# Patient Record
Sex: FEMALE | Race: WHITE
[De-identification: names, ages, dates, MRNs, and addresses within clinical notes are randomized per-mention and may not be internally consistent; named-entity substitution may affect disease eponyms.]

---

## 2019-12-22 ENCOUNTER — HOSPITAL ENCOUNTER (INPATIENT)
Dept: HOSPITAL 92 - ERS | Age: 74
LOS: 11 days | Discharge: NURSING, REHAB OR LONG-TERM CARE | DRG: 492 | End: 2020-01-02
Attending: SURGERY | Admitting: SURGERY
Payer: MEDICARE

## 2019-12-22 VITALS — BODY MASS INDEX: 23 KG/M2

## 2019-12-22 DIAGNOSIS — S92.321A: ICD-10-CM

## 2019-12-22 DIAGNOSIS — Z87.891: ICD-10-CM

## 2019-12-22 DIAGNOSIS — I50.32: ICD-10-CM

## 2019-12-22 DIAGNOSIS — J84.112: ICD-10-CM

## 2019-12-22 DIAGNOSIS — J12.3: ICD-10-CM

## 2019-12-22 DIAGNOSIS — S82.841A: Primary | ICD-10-CM
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
## 2019-12-22 DIAGNOSIS — E11.65: ICD-10-CM
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
## 2019-12-22 DIAGNOSIS — I25.10: ICD-10-CM

## 2019-12-22 DIAGNOSIS — D62: ICD-10-CM

## 2019-12-22 DIAGNOSIS — Z88.0: ICD-10-CM

## 2019-12-22 DIAGNOSIS — E87.6: ICD-10-CM

## 2019-12-22 DIAGNOSIS — S92.331A: ICD-10-CM

## 2019-12-22 DIAGNOSIS — Z95.0: ICD-10-CM

## 2019-12-22 DIAGNOSIS — N39.0: ICD-10-CM

## 2019-12-22 DIAGNOSIS — Z90.49: ICD-10-CM

## 2019-12-22 DIAGNOSIS — J96.01: ICD-10-CM
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
## 2019-12-22 DIAGNOSIS — Z90.710: ICD-10-CM

## 2019-12-22 DIAGNOSIS — S06.0X9A: ICD-10-CM

## 2019-12-22 DIAGNOSIS — E27.40: ICD-10-CM

## 2019-12-22 DIAGNOSIS — N28.9: ICD-10-CM

## 2019-12-22 LAB
ALBUMIN SERPL BCG-MCNC: 3.8 G/DL (ref 3.4–4.8)
ALP SERPL-CCNC: 95 U/L (ref 40–110)
ALT SERPL W P-5'-P-CCNC: 12 U/L (ref 8–55)
ANION GAP SERPL CALC-SCNC: 14 MMOL/L (ref 10–20)
APTT PPP: 22.4 SEC (ref 22.9–36.1)
AST SERPL-CCNC: 24 U/L (ref 5–34)
BASOPHILS # BLD AUTO: 0.1 THOU/UL (ref 0–0.2)
BASOPHILS NFR BLD AUTO: 0.5 % (ref 0–1)
BILIRUB SERPL-MCNC: 1 MG/DL (ref 0.2–1.2)
BUN SERPL-MCNC: 14 MG/DL (ref 9.8–20.1)
CALCIUM SERPL-MCNC: 9 MG/DL (ref 7.8–10.44)
CHLORIDE SERPL-SCNC: 102 MMOL/L (ref 98–107)
CO2 SERPL-SCNC: 24 MMOL/L (ref 23–31)
CREAT CL PREDICTED SERPL C-G-VRATE: 0 ML/MIN (ref 70–130)
EOSINOPHIL # BLD AUTO: 0.1 THOU/UL (ref 0–0.7)
EOSINOPHIL NFR BLD AUTO: 1.2 % (ref 0–10)
GLOBULIN SER CALC-MCNC: 4.2 G/DL (ref 2.4–3.5)
GLUCOSE SERPL-MCNC: 184 MG/DL (ref 83–110)
HGB BLD-MCNC: 13.7 G/DL (ref 12–16)
INR PPP: 1.1
LYMPHOCYTES # BLD: 2.4 THOU/UL (ref 1.2–3.4)
LYMPHOCYTES NFR BLD AUTO: 22.2 % (ref 21–51)
MAGNESIUM SERPL-MCNC: 1.8 MG/DL (ref 1.6–2.6)
MCH RBC QN AUTO: 29.9 PG (ref 27–31)
MCV RBC AUTO: 87.4 FL (ref 78–98)
MONOCYTES # BLD AUTO: 0.9 THOU/UL (ref 0.11–0.59)
MONOCYTES NFR BLD AUTO: 8.4 % (ref 0–10)
NEUTROPHILS # BLD AUTO: 7.4 THOU/UL (ref 1.4–6.5)
NEUTROPHILS NFR BLD AUTO: 67.8 % (ref 42–75)
PLATELET # BLD AUTO: 222 THOU/UL (ref 130–400)
POTASSIUM SERPL-SCNC: 4.2 MMOL/L (ref 3.5–5.1)
PROTHROMBIN TIME: 14 SEC (ref 12–14.7)
RBC # BLD AUTO: 4.56 MILL/UL (ref 4.2–5.4)
SODIUM SERPL-SCNC: 136 MMOL/L (ref 136–145)
WBC # BLD AUTO: 10.9 THOU/UL (ref 4.8–10.8)

## 2019-12-22 PROCEDURE — 81001 URINALYSIS AUTO W/SCOPE: CPT

## 2019-12-22 PROCEDURE — G0390 TRAUMA RESPONS W/HOSP CRITI: HCPCS

## 2019-12-22 PROCEDURE — 70450 CT HEAD/BRAIN W/O DYE: CPT

## 2019-12-22 PROCEDURE — 87804 INFLUENZA ASSAY W/OPTIC: CPT

## 2019-12-22 PROCEDURE — C1713 ANCHOR/SCREW BN/BN,TIS/BN: HCPCS

## 2019-12-22 PROCEDURE — 87040 BLOOD CULTURE FOR BACTERIA: CPT

## 2019-12-22 PROCEDURE — 96361 HYDRATE IV INFUSION ADD-ON: CPT

## 2019-12-22 PROCEDURE — 87070 CULTURE OTHR SPECIMN AEROBIC: CPT

## 2019-12-22 PROCEDURE — 87389 HIV-1 AG W/HIV-1&-2 AB AG IA: CPT

## 2019-12-22 PROCEDURE — 93005 ELECTROCARDIOGRAM TRACING: CPT

## 2019-12-22 PROCEDURE — 83036 HEMOGLOBIN GLYCOSYLATED A1C: CPT

## 2019-12-22 PROCEDURE — 87206 SMEAR FLUORESCENT/ACID STAI: CPT

## 2019-12-22 PROCEDURE — 71045 X-RAY EXAM CHEST 1 VIEW: CPT

## 2019-12-22 PROCEDURE — 80048 BASIC METABOLIC PNL TOTAL CA: CPT

## 2019-12-22 PROCEDURE — 93306 TTE W/DOPPLER COMPLETE: CPT

## 2019-12-22 PROCEDURE — 71250 CT THORAX DX C-: CPT

## 2019-12-22 PROCEDURE — 84100 ASSAY OF PHOSPHORUS: CPT

## 2019-12-22 PROCEDURE — 82533 TOTAL CORTISOL: CPT

## 2019-12-22 PROCEDURE — 87116 MYCOBACTERIA CULTURE: CPT

## 2019-12-22 PROCEDURE — 87086 URINE CULTURE/COLONY COUNT: CPT

## 2019-12-22 PROCEDURE — 85025 COMPLETE CBC W/AUTO DIFF WBC: CPT

## 2019-12-22 PROCEDURE — 76000 FLUOROSCOPY <1 HR PHYS/QHP: CPT

## 2019-12-22 PROCEDURE — 85610 PROTHROMBIN TIME: CPT

## 2019-12-22 PROCEDURE — 85027 COMPLETE CBC AUTOMATED: CPT

## 2019-12-22 PROCEDURE — 96374 THER/PROPH/DIAG INJ IV PUSH: CPT

## 2019-12-22 PROCEDURE — 96375 TX/PRO/DX INJ NEW DRUG ADDON: CPT

## 2019-12-22 PROCEDURE — 87205 SMEAR GRAM STAIN: CPT

## 2019-12-22 PROCEDURE — 87633 RESP VIRUS 12-25 TARGETS: CPT

## 2019-12-22 PROCEDURE — 36416 COLLJ CAPILLARY BLOOD SPEC: CPT

## 2019-12-22 PROCEDURE — 80053 COMPREHEN METABOLIC PANEL: CPT

## 2019-12-22 PROCEDURE — 36415 COLL VENOUS BLD VENIPUNCTURE: CPT

## 2019-12-22 PROCEDURE — 85730 THROMBOPLASTIN TIME PARTIAL: CPT

## 2019-12-22 PROCEDURE — 83880 ASSAY OF NATRIURETIC PEPTIDE: CPT

## 2019-12-22 PROCEDURE — 82550 ASSAY OF CK (CPK): CPT

## 2019-12-22 PROCEDURE — 83735 ASSAY OF MAGNESIUM: CPT

## 2019-12-22 PROCEDURE — 85007 BL SMEAR W/DIFF WBC COUNT: CPT

## 2019-12-22 PROCEDURE — 72170 X-RAY EXAM OF PELVIS: CPT

## 2019-12-22 PROCEDURE — 84484 ASSAY OF TROPONIN QUANT: CPT

## 2019-12-22 PROCEDURE — 94640 AIRWAY INHALATION TREATMENT: CPT

## 2019-12-22 PROCEDURE — 82553 CREATINE MB FRACTION: CPT

## 2019-12-22 PROCEDURE — 93010 ELECTROCARDIOGRAM REPORT: CPT

## 2019-12-22 RX ADMIN — CLINDAMYCIN PHOSPHATE SCH MLS: 900 INJECTION, SOLUTION INTRAVENOUS at 21:39

## 2019-12-22 RX ADMIN — DOCUSATE SODIUM 50 MG AND SENNOSIDES 8.6 MG SCH TAB: 8.6; 5 TABLET, FILM COATED ORAL at 20:06

## 2019-12-22 RX ADMIN — INSULIN HUMAN PRN UNIT: 100 INJECTION, SOLUTION PARENTERAL at 18:06

## 2019-12-22 RX ADMIN — INSULIN HUMAN PRN UNIT: 100 INJECTION, SOLUTION PARENTERAL at 21:39

## 2019-12-22 NOTE — RAD
RIGHT KNEE FOUR VIEWS:

 

HISTORY:

Fall. Right knee injury.

 

FINDINGS:

Mild joint space narrowing of the medial compartment. Mild tricompartmental osteophytosis. No acute f
racture or dislocation is apparent. Fluid distends the suprapatellar bursa on the lateral view withou
t a fat fluid level.

 

IMPRESSION:

1. Fluid distention of the joint capsule and suprapatellar bursa. This could represent blood or fluid
 from an acute injury or simply reflect an effusion from otherwise mild osteoarthritic changes.

 

2. No fractures are directly visualized.

 

POS: BST

## 2019-12-22 NOTE — RAD
RIGHT ANKLE THREE VIEWS:

 

HISTORY:

Fall. Right ankle injury.

 

FINDINGS:

Mildly comminuted, oblique fracture of the distal fibula is present just above the level of the ankle
 mortise. Minimal lateral displacement.

 

Nondisplaced oblique fracture involves the base of the medial malleolus. Tibiotalar alignment is main
tained. There is heterogeneous mixed sclerotic and lucent subcortical abnormality involving the media
l corner of the talar dome.

 

IMPRESSION:

1. Bimalleolar fracture, right ankle.

 

2. Osteochondral abnormality involving the medial corner of the talar dome.

 

POS: BST

## 2019-12-22 NOTE — RAD
EXAM:

XR Pelvis AP STANDARD



PROVIDED CLINICAL HISTORY:

Pain



FINDINGS:

There is no evidence for fracture or other acute osseous abnormality. Alignment appears anatomic. Katie
nt spaces appear preserved.



IMPRESSION:

No evidence for an acute osseous abnormality. If there is persistent clinical concern, conservative m
anagement and follow-up imaging advised.



Reported By: Tahir Ruby 

Electronically Signed:  12/22/2019 11:56 AM

## 2019-12-22 NOTE — CON
DATE OF CONSULTATION:  



This is Familia Lee PA-C dictating a report for Anthony Becker MD.



HISTORY OF PRESENT ILLNESS:  We were asked by ER and Trauma to see patient.  The

patient was in her normal state of health.  When she was on her stairs, she twisted,

had a little bump and fell down.  She has multiple bodily aches.  Her lower back

hurts a little bit as does her tailbone.  Her forearms hurt a little bit and she

sustained a foot and ankle fracture on the right side.  She has no loss of

sensation.  Denies any loss of consciousness.  Her imaging studies have been

acquired to the areas of pain that she complains about and the only positive

findings were a right ankle and foot fracture.  Again, no numbness and tingling down

the leg but quite sore, especially with moving.  Currently, she is being worked up

in the emergency room.  We have called the operating room to get a space on the

schedule to get her ankle fixed and possibly home today.  Her son is at the bedside

with her. 



PAST MEDICAL HISTORY:  Positive for cardiac, diabetes.



SURGERIES:  Pacemaker, hysterectomy.



SOCIAL HISTORY:  Retired from [employer].  Past smoker, quit 20

years ago.  No ETOH or drug use. 



FAMILY HISTORY:  For this visit is noncontributory, although her mother came in for

surgery and got a postop pneumonia in past, so she is quite fearful of this. 



ALLERGIES:  SHE HAS HAD REMOTE ALLERGIES IN THE PAST TO PENICILLIN, BUT SHE STATES

SHE IS NO LONGER ALLERGIC. 



MEDICATIONS:  She takes some OTC medications but no blood thinners and controls her

diabetes with her diet.  She is very independent and active. 



REVIEW OF SYSTEMS:  Again, multiple bodily aches and pains, but no chest pain,

shortness of breath.  She does have positive pain to the right ankle and foot with

positive edema and ecchymosis.  Rest of review of systems negative. 



PHYSICAL EXAMINATION:

GENERAL:  Well-nourished, well-developed female, alert, pleasant, no acute distress.

 Speech clear.  Affect pleasant.  Answer questions appropriately.  Alert and

oriented x3. 

HEENT:  Normal exam.  Face symmetric.  Tongue midline. 

NECK:  Supple.  Trachea midline. 

LUNGS:  Respirations 16.  No acute distress.  ER has around some oxygen currently 

PELVIS:  Rocked, no pain.   

EXTREMITIES:  Upper extremities equal size, shape, symmetry, normal bulk and tone.

Movements are equal as are sensations.  Bilateral lower extremities are equal size,

shape, symmetry, normal bulk and tone with the exception of the right ankle and foot

have positive edema and ecchymosis.  She has good DP, PT pulses bilaterally and her

sensations are intact.  She is able to wiggle both feet fine but this causes some

ankle pain. 



ASSESSMENT:  Fall with ensuing ankle-foot fracture on the right.



PLAN:  I spoke with son and mom.  Discussed surgical intervention, risks and

benefits of surgery.  Other questions and concerns have been addressed.  They are

amenable to go forward with surgery.  ER will continue their workup to see if she

has any medical symptoms or conditions that would negate her from having surgery.

Trauma will be admitting.  Once we get her up to the operating room, we will get her

on some antibiotics, get her consented for the surgery and then if all goes well,

may be get her discharged later today or early tomorrow, which makes her quite

happy. 







Job ID:  [id number]

## 2019-12-22 NOTE — CT
Exam:

CT brain



PROVIDED CLINICAL HISTORY:

Head injury



COMPARISON:

None



FINDINGS:

The ventricular system is normal in size and morphology.  No evidence for intracranial hemorrhage or 
mass effect. The extracranial soft tissues and osseous structures demonstrate no evidence for an

acute abnormality.



IMPRESSION:

No evidence for intracranial hemorrhage or mass effect.



Reported By: Tahir Ruby 

Electronically Signed:  12/22/2019 11:23 AM

## 2019-12-22 NOTE — RAD
EXAM:

Portable chest



PROVIDED CLINICAL HISTORY:

Preop



COMPARISON:

None



FINDINGS:

The cardiac silhouette appears likely enlarged. There is obscuration of the left heart margin, which 
could be on the basis of lingular consolidation. Prominence of the pulmonary interstitium is noted

diffusely, chronicity of which is not certain. No evidence for large effusion or evidence for pneumot
horax. Bilateral calcified breast prostheses. Vascular calcification. Left subclavian cardiac

pacing device with lead tips overlying expected locations of RA and RV. No focal consolidation, pleur
al fluid or pneumothorax evident.



IMPRESSION:

Cardiomegaly and diffuse prominence of the pulmonary interstitium, the chronicity of the latter of wh
ich is not certain. Possible lingular airspace disease. Consider correlating with a lateral view.



Reported By: Tahir Ruby 

Electronically Signed:  12/22/2019 12:53 PM

## 2019-12-22 NOTE — RAD
RIGHT HIP TWO VIEWS:

 

HISTORY:

Fall. Hip injury.

 

FINDINGS:

Joint space is preserved. Minimal osteophytosis. Femoral head contour is maintained. No acute fractur
e or dislocation. Calcification over the arterial structures.

 

IMPRESSION:

1. No acute osseous abnormalities are demonstrated.

 

2. Atherosclerosis.

 

POS: BST

## 2019-12-22 NOTE — RAD
RIGHT FOOT THREE VIEWS:

 

HISTORY:

Right foot injury.

 

FINDINGS:

Subtle oblique lucency through the mid shaft of the second metatarsal. No significant displacement. O
verlying soft tissue swelling. On two of the views there is a subtle oblique lucency across the proxi
mal metaphysis of the third metatarsal.

 

Lisfranc joint alignment is anatomic. Degenerative changes throughout the foot.

 

Hallux valgus and bunion deformity.

 

Bimalleolar ankle fracture is partially visualized.

 

IMPRESSION:

1. Right second and third metatarsal fractures.

 

2. Ankle abnormalities better detailed on dedicated ankle examination.

 

3. Hallux valgus and bunion deformity.

 

POS: BST

## 2019-12-22 NOTE — HP
Admission Date: 12/22/19



REQUESTING PHYSICIAN:  Dr. Barrera, ER physician.



ATTENDING TRAUMA SURGEON:  Dr. Winn.



ORTHOPEDIC SURGERY:  Dr. Becker.



HISTORY OF PRESENT ILLNESS:  This is a 74-year-old female, who was at her home

walking down her wooden steps when the step broke causing her to fall 
approximately

6 feet, landing on her buttocks causing her to twist her right ankle.  The 
patient

states that she also hit the back of her head.  The patient reports being 
knocked

out for a very short period of time. 



The patient states that this happened yesterday sometime in the afternoon and 
had to

crawl to get help, as she was outside. The patient states that when she fell, 
she

landed on dirt and grass.  The patient continued to have right ankle pain and

swelling and presented to the emergency room this morning, where she was 
evaluated.

The patient also reports that she was tested positive for flu approximately 2 
weeks

ago and has been taking NyQuil Flu and Cold since.  The patient does have an

occasional nonproductive cough currently.  The patient denies recent fever or

chills.  The patient denies any chest pain, shortness of breath, or dizziness 
prior

to falling.  The patient has recently moved to the area from [place].  The

patient's primary care physician in [place] is Dr. Rodríguez.  The patient also 
has a

history of type 2 diabetes, which is diet controlled. 



PAST MEDICAL HISTORY:  Type 2 diabetes, diet controlled; coronary artery disease
;

slow heartbeat with syncopal episodes. 



PAST SURGICAL HISTORY:  Pacemaker placement in January of 2018, appendectomy, 
and

hysterectomy. 



ALLERGIES:  NO KNOWN DRUG ALLERGIES.



CURRENT MEDICATIONS:  NyQuil Flu and Cold.



SOCIAL HISTORY:  The patient quit smoking 30 years ago, smoked for 
approximately 26

years, denies alcohol use, denies any illicit drug use. 



REVIEW OF SYSTEMS:  A 10-point review of systems is negative unless otherwise

indicated in the above HPI. 



PHYSICAL EXAMINATION:

VITAL SIGNS:  Blood pressure 103/77, pulse 92, respirations 20, SpO2 of 96% on 
room

air, and temperature 98.3. 

GENERAL:  Well-appearing elderly female, awake, alert, in no distress. 

HEENT:  Head is atraumatic and normocephalic, midface is stable, extraocular 
muscles

intact, mucous membranes moist. 

NECK:  No cervical spine tenderness, normal range of motion of neck, no JVD, 
trachea

is midline. 

RESPIRATORY:  Equal chest rise and fall, no respiratory distress, bilateral 
breath

sounds are clear without any wheezing, rales, or rhonchi. 

CARDIAC:  Regular rate, regular rhythm, 2/6 systolic murmur, no pedal edema. 

ABDOMEN:  Soft, nontender, nondistended. 

EXTREMITIES:  Moves all extremities, no focal deficits, distal pulses intact, 2
+ in

all extremities, swelling and deformity to the right ankle, also swelling to 
right

knee with a small abrasion.  Right lower extremity currently splinted.  Pelvis 
is

stable.  No back pain. 

NEUROLOGIC:  The patient is oriented to person, place, time, and event.  No 
focal

deficits. 



LABORATORY DATA:  WBC 10.9, RBC 4.56, hemoglobin 13.7, hematocrit 39.9, and

platelets 222.  Sodium 136, potassium 4.2, chloride 102, BUN 14, creatinine 0.85
,

estimated GFR 65, glucose 184, calcium 9.0, total bilirubin 1.0, AST 24, ALT 12,

alkaline phos 95, and albumin 3.8. 



DIAGNOSTIC DATA:  Right ankle x-ray:  Impression; bimalleolar fracture, right 
ankle.

 Osteochondral abnormality involving the medial corner of the talar dome. 



Brain CT:  Impression; no evidence for intracranial hemorrhage or mass effect. 



Right foot x-ray:  Impression; right 2nd and 3rd metatarsal fractures. 



Right knee x-ray:  Fluid distension of the joint capsule and suprapatellar 
bursa.

No fractures are directly visualized. 



Pelvis x-ray:  Impression; there is no evidence for fracture or other acute 
osseous

abnormality. 



Hip x-ray, right:  No acute osseous abnormalities are demonstrated. 



Chest x-ray:  Impression; cardiomegaly and diffuse prominence of the pulmonary

interstitium, the chronicity of which is not concern.  Possible lingular 
airspace

disease. 



ASSESSMENT:  

1. Fall from stairs approximately 6 feet with loss of consciousness.

2. Bimalleolar fracture, right ankle.

3. Right knee fluid distension of the joint capsule and suprapatellar bursa.

4. Right 2nd and 3rd metatarsal fractures.

5. Concussion.

6. History of type 2 diabetes, diet controlled, Pacemaker placement, and recent 
flu two weeks ago.



PLAN:  The patient will be n.p.o. with IV maintenance fluids, normal saline.  
Dr. Becker with Orthopedic Surgery plans to take the patient to the OR for repair 
of

this right bimalleolar fracture.  The patient's diet will be advanced to a 
diabetic

diet postop as tolerated.  A PT and OT consult will be placed to evaluate and 
treat

postop.  We will place a post-acute screen for possible rehab placement.  We 
will

place the patient on mechanical and chemical DVT prophylaxis postop.  The plan 
was

discussed with the patient, who agrees.  The plan will be discussed with the

attending after this dictation. 







Job ID:  [id number]



MTDD

## 2019-12-23 LAB
ANION GAP SERPL CALC-SCNC: 10 MMOL/L (ref 10–20)
BASOPHILS # BLD AUTO: 0 THOU/UL (ref 0–0.2)
BASOPHILS NFR BLD AUTO: 0.2 % (ref 0–1)
BUN SERPL-MCNC: 19 MG/DL (ref 9.8–20.1)
CALCIUM SERPL-MCNC: 7.8 MG/DL (ref 7.8–10.44)
CHLORIDE SERPL-SCNC: 106 MMOL/L (ref 98–107)
CO2 SERPL-SCNC: 23 MMOL/L (ref 23–31)
CREAT CL PREDICTED SERPL C-G-VRATE: 49 ML/MIN (ref 70–130)
EOSINOPHIL # BLD AUTO: 0 THOU/UL (ref 0–0.7)
EOSINOPHIL NFR BLD AUTO: 0.2 % (ref 0–10)
GLUCOSE SERPL-MCNC: 208 MG/DL (ref 83–110)
HGB BLD-MCNC: 10.8 G/DL (ref 12–16)
LYMPHOCYTES # BLD: 2.6 THOU/UL (ref 1.2–3.4)
LYMPHOCYTES NFR BLD AUTO: 24.8 % (ref 21–51)
MAGNESIUM SERPL-MCNC: 1.9 MG/DL (ref 1.6–2.6)
MCH RBC QN AUTO: 29.8 PG (ref 27–31)
MCV RBC AUTO: 89.6 FL (ref 78–98)
MONOCYTES # BLD AUTO: 0.8 THOU/UL (ref 0.11–0.59)
MONOCYTES NFR BLD AUTO: 7.5 % (ref 0–10)
NEUTROPHILS # BLD AUTO: 7 THOU/UL (ref 1.4–6.5)
NEUTROPHILS NFR BLD AUTO: 67.3 % (ref 42–75)
PLATELET # BLD AUTO: 192 THOU/UL (ref 130–400)
POTASSIUM SERPL-SCNC: 4.2 MMOL/L (ref 3.5–5.1)
RBC # BLD AUTO: 3.63 MILL/UL (ref 4.2–5.4)
SODIUM SERPL-SCNC: 135 MMOL/L (ref 136–145)
WBC # BLD AUTO: 10.5 THOU/UL (ref 4.8–10.8)

## 2019-12-23 RX ADMIN — DOCUSATE SODIUM 50 MG AND SENNOSIDES 8.6 MG SCH TAB: 8.6; 5 TABLET, FILM COATED ORAL at 08:40

## 2019-12-23 RX ADMIN — DOCUSATE SODIUM 50 MG AND SENNOSIDES 8.6 MG SCH TAB: 8.6; 5 TABLET, FILM COATED ORAL at 20:50

## 2019-12-23 RX ADMIN — HYDROCORTISONE SODIUM SUCCINATE SCH MG: 100 INJECTION, POWDER, FOR SOLUTION INTRAMUSCULAR; INTRAVENOUS at 17:14

## 2019-12-23 RX ADMIN — INSULIN HUMAN PRN UNIT: 100 INJECTION, SOLUTION PARENTERAL at 22:07

## 2019-12-23 RX ADMIN — INSULIN HUMAN PRN UNIT: 100 INJECTION, SOLUTION PARENTERAL at 06:04

## 2019-12-23 RX ADMIN — CLINDAMYCIN PHOSPHATE SCH MLS: 900 INJECTION, SOLUTION INTRAVENOUS at 05:58

## 2019-12-23 NOTE — PRG
DATE OF SERVICE:  12/23/2019



SUBJECTIVE:  Ms. García is a 74-year-old woman postop day #1, status post ORIF of

bimalleolar ankle fracture.  This morning, she reports adequate pain control. 



Her blood pressures have been low overnight.



OBJECTIVE:  VITAL SIGNS:  Include blood pressure 92/53, pulse 73, respiratory rate

16, temperature 97.6 degrees Fahrenheit, oxygen saturation is 95% on room air.

Admitting blood pressure was 112/56. 

HEENT:  Pupils are equal, round, and reactive to light and accommodation. 

HEART:  Reveals regular rate and rhythm.  No murmurs or gallops auscultated. 

LUNGS:  Clear to auscultation bilaterally.  Breathing, regular and nonlabored. 

ABDOMEN:  Soft, nontender, and nondistended. 

NEUROLOGIC:  Reveals no focal deficits present. 

EXTREMITIES:  Reveal 2+ radial and pedal pulses bilaterally.  Right lower extremity

immobilized in a long splint. 



LABORATORY FINDINGS:  Today include a CBC with 10,500 white blood cells, hemoglobin

and hematocrit of 10.8 and 32.5 respectively.  Platelet count 192,000. 



Metabolic profile; sodium 135, potassium 4.2, chloride is 106, bicarb is 23, BUN 19,

creatinine 0.93, glucose 208, total bilirubin 1.9, and phosphorus is 4.7.  Serum

cortisol level markedly low at 1.2. 



IMPRESSION:  

1. Postop day #1 status post bimalleolar right ankle fracture repair.

2. Acute adrenal insufficiency.

3. Acute blood loss anemia.



PLAN:  

1. Initiate physical and occupational therapy.

2. The patient will be placed on hydrocortisone and anticipate weaning of

hydrocortisone over the next 24 to 48 hours as blood pressure tolerates. 

3. The patient will be transferred to inpatient rehabilitation once hemodynamically 

stable.

4. Anticipate discharge within the next 24 to 48 hours.

5. Above findings and plan discussed with the patient who indicates understanding

and information given.  I have answered her questions. 







Job ID:  [id number]

## 2019-12-23 NOTE — PRG
DATE OF SERVICE:  12/23/2019



SUBJECTIVE:  The patient was seen this evening during rounds.  She is postop day 0

after right bimalleolar fracture fixation.  The patient also has a right 2nd and 3rd

metatarsal fracture and concussion.  At the time of my evaluation, the patient

reported her pain was well controlled.  She had no complaint. 



OBJECTIVE:  VITAL SIGNS:  Temperature 97.7, pulse 75, respirations 16, oxygen

saturation 93% on room air, blood pressure 94/51. 

GENERAL:  Well-appearing elderly female, sitting up in bed with no signs of acute

distress. 

PULMONARY:  Equal chest rise and fall.  No signs of acute respiratory distress.



ASSESSMENT:  

1. Status post fall from 6 feet.

2. Right bimalleolar fracture.

3. Right 2nd and 3rd metatarsal fractures.

4. Concussion.

5. History of diabetes, pacemaker, and coronary artery disease.



PLAN:  Continue current diet and pain regimen.  Discontinue IV fluids.  The patient

to work with Physical and Occupational Therapy tomorrow and is pending placement in

acute rehab facility. 







Job ID:  [id number]

## 2019-12-24 LAB
ANION GAP SERPL CALC-SCNC: 7 MMOL/L (ref 10–20)
BUN SERPL-MCNC: 13 MG/DL (ref 9.8–20.1)
CALCIUM SERPL-MCNC: 8.3 MG/DL (ref 7.8–10.44)
CHLORIDE SERPL-SCNC: 107 MMOL/L (ref 98–107)
CO2 SERPL-SCNC: 27 MMOL/L (ref 23–31)
CREAT CL PREDICTED SERPL C-G-VRATE: 59 ML/MIN (ref 70–130)
GLUCOSE SERPL-MCNC: 156 MG/DL (ref 83–110)
HGB BLD-MCNC: 10.6 G/DL (ref 12–16)
MAGNESIUM SERPL-MCNC: 2.4 MG/DL (ref 1.6–2.6)
MCH RBC QN AUTO: 29.7 PG (ref 27–31)
MCV RBC AUTO: 90.4 FL (ref 78–98)
MDIFF COMPLETE?: YES
PLATELET # BLD AUTO: 208 THOU/UL (ref 130–400)
POTASSIUM SERPL-SCNC: 4.1 MMOL/L (ref 3.5–5.1)
RBC # BLD AUTO: 3.58 MILL/UL (ref 4.2–5.4)
SODIUM SERPL-SCNC: 137 MMOL/L (ref 136–145)
WBC # BLD AUTO: 8.7 THOU/UL (ref 4.8–10.8)

## 2019-12-24 RX ADMIN — INSULIN HUMAN PRN UNIT: 100 INJECTION, SOLUTION PARENTERAL at 21:22

## 2019-12-24 RX ADMIN — HYDROCORTISONE SODIUM SUCCINATE SCH MG: 100 INJECTION, POWDER, FOR SOLUTION INTRAMUSCULAR; INTRAVENOUS at 00:38

## 2019-12-24 RX ADMIN — HYDROCORTISONE SODIUM SUCCINATE SCH: 100 INJECTION, POWDER, FOR SOLUTION INTRAMUSCULAR; INTRAVENOUS at 23:29

## 2019-12-24 RX ADMIN — INSULIN HUMAN PRN UNIT: 100 INJECTION, SOLUTION PARENTERAL at 06:15

## 2019-12-24 RX ADMIN — HYDROCORTISONE SODIUM SUCCINATE SCH MG: 100 INJECTION, POWDER, FOR SOLUTION INTRAMUSCULAR; INTRAVENOUS at 17:07

## 2019-12-24 RX ADMIN — DOCUSATE SODIUM 50 MG AND SENNOSIDES 8.6 MG SCH TAB: 8.6; 5 TABLET, FILM COATED ORAL at 08:31

## 2019-12-24 RX ADMIN — INSULIN HUMAN PRN UNIT: 100 INJECTION, SOLUTION PARENTERAL at 17:07

## 2019-12-24 RX ADMIN — DOCUSATE SODIUM 50 MG AND SENNOSIDES 8.6 MG SCH TAB: 8.6; 5 TABLET, FILM COATED ORAL at 19:50

## 2019-12-24 RX ADMIN — HYDROCORTISONE SODIUM SUCCINATE SCH MG: 100 INJECTION, POWDER, FOR SOLUTION INTRAMUSCULAR; INTRAVENOUS at 11:43

## 2019-12-24 RX ADMIN — HYDROCORTISONE SODIUM SUCCINATE SCH MG: 100 INJECTION, POWDER, FOR SOLUTION INTRAMUSCULAR; INTRAVENOUS at 05:08

## 2019-12-24 RX ADMIN — INSULIN HUMAN PRN UNIT: 100 INJECTION, SOLUTION PARENTERAL at 11:53

## 2019-12-24 NOTE — PRG
DATE OF SERVICE:  12/24/2019



SUBJECTIVE:  The patient is postoperative day 2 status post open reduction and

internal fixation of a right bimalleolar fracture, closed treatment of right second

and third metatarsal fractures.  The patient overnight had no issues.  Yesterday,

she was started on hydrocortisone for adrenal insufficiency, which appears to have

made an improvement.  The patient's pain is controlled.  She is tolerating a diet

and began working with Physical and Occupational Therapy. 



OBJECTIVE:  VITAL SIGNS:  Temperature is 98.6, heart rate 90, blood pressure 119/60,

respirations 16, oxygen saturation 91% on room air. 

GENERAL:  The patient is resting comfortably in bed.  She is awake, alert, and

oriented. 

HEENT:  Unremarkable. 

LUNGS:  Clear to auscultation bilaterally. 

HEART:  Regular rate and rhythm. 

ABDOMEN:  Soft, flat, and nontender with active bowel sounds. 

EXTREMITIES:  Neurovascularly intact x4.  Right lower extremity has a clean, dry,

and intact splint on it. 



LABORATORY FINDINGS:  White blood cell count 8.7, hemoglobin 10.6, hematocrit 32.4,

platelets 208.  Sodium 137, potassium 4.1, chloride 107, CO2 of 27, BUN 13,

creatinine 0.78, glucose 156, magnesium 2.4, phosphorus 2.9. 



IMAGING STUDIES:  There are no radiographs to review this morning.



ASSESSMENT:  

1. Status post ground level fall.

2. Status post open reduction and internal fixation of right bimalleolar fracture,

postoperative day 2. 

3. Acute adrenal insufficiency, improving.

4. Acute blood loss anemia, stable.



PLAN:  Plan will be to continue physical and occupational therapy, begin placement

process, continue hydrocortisone, and likely be able to wean off within the next 24

hours. 







Job ID:  [id number]

## 2019-12-24 NOTE — EKG
Test Reason : SX

Blood Pressure : ***/*** mmHG

Vent. Rate : 082 BPM     Atrial Rate : 082 BPM

   P-R Int : 150 ms          QRS Dur : 122 ms

    QT Int : 408 ms       P-R-T Axes : 060 -35 038 degrees

   QTc Int : 476 ms

 

Normal sinus rhythm

Left axis deviation

Right bundle branch block

Abnormal ECG

 

Confirmed by ASHLEY VALENTIN, IZAIAH (12),  NAZIA GAO ([id number]) on 12/24/2019 2:23:10 PM

 

Referred By:  ASHLEY           Confirmed By:IZAIAH RAMIREZ MD

## 2019-12-25 RX ADMIN — INSULIN HUMAN PRN UNIT: 100 INJECTION, SOLUTION PARENTERAL at 17:47

## 2019-12-25 RX ADMIN — HYDROCORTISONE SODIUM SUCCINATE SCH MG: 100 INJECTION, POWDER, FOR SOLUTION INTRAMUSCULAR; INTRAVENOUS at 12:05

## 2019-12-25 RX ADMIN — INSULIN HUMAN PRN UNIT: 100 INJECTION, SOLUTION PARENTERAL at 12:06

## 2019-12-25 RX ADMIN — DOCUSATE SODIUM 50 MG AND SENNOSIDES 8.6 MG SCH: 8.6; 5 TABLET, FILM COATED ORAL at 20:35

## 2019-12-25 RX ADMIN — HYDROCORTISONE SODIUM SUCCINATE SCH: 100 INJECTION, POWDER, FOR SOLUTION INTRAMUSCULAR; INTRAVENOUS at 05:07

## 2019-12-25 RX ADMIN — DOCUSATE SODIUM 50 MG AND SENNOSIDES 8.6 MG SCH TAB: 8.6; 5 TABLET, FILM COATED ORAL at 08:18

## 2019-12-25 RX ADMIN — HYDROCORTISONE SODIUM SUCCINATE SCH: 100 INJECTION, POWDER, FOR SOLUTION INTRAMUSCULAR; INTRAVENOUS at 20:34

## 2019-12-25 RX ADMIN — INSULIN HUMAN PRN UNIT: 100 INJECTION, SOLUTION PARENTERAL at 22:07

## 2019-12-25 NOTE — PRG
DATE OF SERVICE:  12/24/2019



SUBJECTIVE:  Ms. García is a 74-year-old female with status post ground level fall

after she sustained dry ankle fractures.  She underwent ORIF of right ankle fracture

postop day #2 and she also had acute adrenal insufficiency improved.  Currently, the

patient reports she has been doing good.  Pain is well controlled.  She tolerated

with her regular diet and she has been working with PT/OT.  She is using walker with

minimal support.  She developed no fever or shortness of breath. 



OBJECTIVE:  GENERAL:  Currently, patient lying down in bed comfortable with no acute

respiratory distress. 

VITAL SIGNS:  Stable. 

LUNGS:  Clear bilaterally. 

HEART:  Regular rate and rhythm. 

EXTREMITIES:  Neurovascularly intact x4.  Postop dressing and splint of the right

ankle clean, dry, and intact. 



PLAN:  Continue supportive care.  Continue pain control.  Continue DVT prophylaxis.

Anticipate placement in rehabilitation facility. 







Job ID:  [id number]

## 2019-12-25 NOTE — PRG
DATE OF SERVICE:  12/25/2019



SUBJECTIVE:  The patient remains on the surgical floor.  She is postop day 3 status

post open reduction and internal fixation of her right bimalleolar fracture and

closed treatment of right 2nd and 3rd metatarsal fracture.  The patient had no

issues overnight.  This morning, her pain is controlled.  She started working with

physical and occupational therapy, and she is tolerating a diet.  She was started on

hydrocortisone 2 days ago for adrenal insufficiency, which has made a significant

improvement in her blood pressure. 



OBJECTIVE:  VITAL SIGNS:  Temperature is 98.1, heart rate 95, blood pressure 158/76,

respirations 16, and oxygen saturation 92% on 2 L via nasal cannula. 

GENERAL:  The patient is resting comfortably on the side of the bed.  She just

returned from working with therapy.  She is awake, alert, and oriented x3.  Rajiv

Coma Scale is 15. 

LUNGS:  Respirations are clear bilaterally. 

HEART:  Regular rate and rhythm. 

ABDOMEN:  Soft, flat, nontender with active bowel sounds. 

EXTREMITIES:  Neurovascularly intact x4.  Right lower extremity has a clean, dry,

and intact splint on it. 



LABORATORY DATA:  There are no labs to review this morning.  Chest x-ray this

morning "stable exam." 



ASSESSMENT AND PLAN:  

1. Status post ground level fall.

2. Status post open reduction and internal fixation of right bimalleolar fracture,

postop day #3. 

3. Acute adrenal insufficiency, improved.

4. Acute blood loss anemia, stable.



PLAN:  Plan will be to continue encouraging physical and occupational therapy.

Begin weaning hydrocortisone.  Encourage incentive spirometry.  The patient was

given one 

dose of Lasix this morning as it was felt her oxygen saturation was dropping due to

potential fluid overload.  Begin placement process. 







Job ID:  [id number]

## 2019-12-25 NOTE — RAD
PORTABLE CHEST:

 

Date:  12/25/19 

 

HISTORY:  

Shortness of breath. 

 

COMPARISON:  

12/22/19 exam. 

 

FINDINGS:

Heart size is enlarged. Pacemaker is present. Parenchymal lung changes are; stable. Calcified breast 
implants are again noted. 

 

IMPRESSION: 

Stable exam. 

 

 

POS: OFF

## 2019-12-26 LAB — CK MB SERPL-MCNC: 1.4 NG/ML (ref 0–6.6)

## 2019-12-26 PROCEDURE — 0QSNXZZ REPOSITION RIGHT METATARSAL, EXTERNAL APPROACH: ICD-10-PCS | Performed by: ORTHOPAEDIC SURGERY

## 2019-12-26 PROCEDURE — 0QSJ04Z REPOSITION RIGHT FIBULA WITH INTERNAL FIXATION DEVICE, OPEN APPROACH: ICD-10-PCS | Performed by: ORTHOPAEDIC SURGERY

## 2019-12-26 RX ADMIN — DOCUSATE SODIUM 50 MG AND SENNOSIDES 8.6 MG SCH: 8.6; 5 TABLET, FILM COATED ORAL at 08:17

## 2019-12-26 RX ADMIN — DOCUSATE SODIUM 50 MG AND SENNOSIDES 8.6 MG SCH: 8.6; 5 TABLET, FILM COATED ORAL at 20:08

## 2019-12-26 RX ADMIN — HYDROCORTISONE SODIUM SUCCINATE SCH: 100 INJECTION, POWDER, FOR SOLUTION INTRAMUSCULAR; INTRAVENOUS at 03:26

## 2019-12-26 RX ADMIN — INSULIN HUMAN PRN UNIT: 100 INJECTION, SOLUTION PARENTERAL at 21:13

## 2019-12-26 RX ADMIN — HYDROCORTISONE SODIUM SUCCINATE SCH MG: 100 INJECTION, POWDER, FOR SOLUTION INTRAMUSCULAR; INTRAVENOUS at 11:45

## 2019-12-26 RX ADMIN — INSULIN HUMAN PRN UNIT: 100 INJECTION, SOLUTION PARENTERAL at 11:45

## 2019-12-26 RX ADMIN — INSULIN HUMAN PRN UNIT: 100 INJECTION, SOLUTION PARENTERAL at 16:48

## 2019-12-26 RX ADMIN — Medication PRN LOZ: at 21:34

## 2019-12-26 NOTE — PRG
DATE OF SERVICE:  2019



SUBJECTIVE:  The patient was seen this evening during rounds.  She was sitting up in

bed.  She was very emotional and upset.  Her son had came to visit her that along

with the holidays has reminded her multiple traumatic events, where family members

have .  She was overwhelmed with grief and emotion and had a difficult time

calming down. 



OBJECTIVE:  VITAL SIGNS:  Temperature 98.0, pulse 76, respirations 16, oxygen

saturation 94% on room air, blood pressure 96/56. 

GENERAL:  Well-appearing elderly female, sitting up in bed, very emotional with no

signs of acute distress. 

PULMONARY:  Equal chest rise and fall.  No signs of acute respiratory distress.



ASSESSMENT:  

1. Status post fall from 6 feet.

2. Right bimalleolar fracture.

3. Right second and third metatarsal fractures.

4. Concussion.

5. Acute adrenal insufficiency, resolving.

6. History of diabetes, pacemaker, and coronary artery disease.



PLAN:  We will continue the patient's current diet and pain regimen.  We will

increase her sliding scale as her blood sugars have been consistently over 200.  She

will receive a one time dose of p.r.n. Ativan this evening if she is not able to

calm down.  She will continue to work with Physical and Occupational Therapy and is

pending placement at acute rehab facility. 







Job ID:  [id number]

## 2019-12-26 NOTE — PRG
DATE OF SERVICE:  12/26/2019



SUBJECTIVE:  The patient is currently on the surgical floor.  She is postop day 4

status post open reduction and internal fixation of her right bimalleolar fracture

and closed treatment of right second and third metatarsal fractures.  Overnight, the

patient had no issues.  This morning, she reports that she is tolerating a diet.

Her pain is controlled.  She is working with Physical and Occupational Therapy.  The

patient is currently awaiting placement to rehab. 



OBJECTIVE:  VITAL SIGNS:  Temperature 97.7, heart rate 89, blood pressure 155/80,

respirations 18, and oxygen saturation 98% on 2 L via nasal cannula. 

GENERAL:  The patient is resting comfortably in bed.  She is awake, alert, and

verbally appropriate, though she does appear to have some confusion today that when

talking to her, seems to be more related to anxiety and feeling that she needs to

get to rehab today.  We reassured her that we would work on this, but due to

insurance, she may not be able to go until tomorrow. 

HEENT:  Unremarkable. 

LUNGS:  Clear to auscultation with good inspiratory and expiratory effort. 

HEART:  Regular rate and rhythm. 

ABDOMEN:  Soft, flat, and nontender with active bowel sounds. 

EXTREMITIES:  Neurovascularly intact x4.  Postop dressing is clean, dry, and intact.

 There are no labs or radiographs reviewed this morning. 



ASSESSMENT:  

1. Status post ground level fall.

2. Postop day 4 status post open reduction and internal fixation of right

bimalleolar fracture. 

3. Acute renal insufficiency, resolved.

4. Acute blood loss anemia, stable.



PLAN:  Plan will be to discontinue her hydrocortisone.  Continue encouraging her to

work with Physical and Occupational Therapy and incentive spirometry use.  We will

await final placement decision. 





Job ID:  [id number]

## 2019-12-27 LAB
ANION GAP SERPL CALC-SCNC: 12 MMOL/L (ref 10–20)
BACTERIA UR QL AUTO: (no result) HPF
BASOPHILS # BLD AUTO: 0.1 THOU/UL (ref 0–0.2)
BASOPHILS NFR BLD AUTO: 0.7 % (ref 0–1)
BUN SERPL-MCNC: 15 MG/DL (ref 9.8–20.1)
CALCIUM SERPL-MCNC: 8.2 MG/DL (ref 7.8–10.44)
CHLORIDE SERPL-SCNC: 102 MMOL/L (ref 98–107)
CO2 SERPL-SCNC: 28 MMOL/L (ref 23–31)
CREAT CL PREDICTED SERPL C-G-VRATE: 52 ML/MIN (ref 70–130)
EOSINOPHIL # BLD AUTO: 0.5 THOU/UL (ref 0–0.7)
EOSINOPHIL NFR BLD AUTO: 4.4 % (ref 0–10)
GLUCOSE SERPL-MCNC: 116 MG/DL (ref 83–110)
GLUCOSE UR STRIP-MCNC: 300 MG/DL
HGB BLD-MCNC: 11.9 G/DL (ref 12–16)
LEUKOCYTE ESTERASE UR QL STRIP.AUTO: 500 LEU/UL
LYMPHOCYTES # BLD: 4.6 THOU/UL (ref 1.2–3.4)
LYMPHOCYTES NFR BLD AUTO: 41.4 % (ref 21–51)
MCH RBC QN AUTO: 30.4 PG (ref 27–31)
MCV RBC AUTO: 90.8 FL (ref 78–98)
MONOCYTES # BLD AUTO: 0.9 THOU/UL (ref 0.11–0.59)
MONOCYTES NFR BLD AUTO: 7.8 % (ref 0–10)
NEUTROPHILS # BLD AUTO: 5.1 THOU/UL (ref 1.4–6.5)
NEUTROPHILS NFR BLD AUTO: 45.8 % (ref 42–75)
PLATELET # BLD AUTO: 260 THOU/UL (ref 130–400)
POTASSIUM SERPL-SCNC: 2.8 MMOL/L (ref 3.5–5.1)
PROT UR STRIP.AUTO-MCNC: 10 MG/DL
RBC # BLD AUTO: 3.91 MILL/UL (ref 4.2–5.4)
RBC UR QL AUTO: (no result) HPF (ref 0–3)
SODIUM SERPL-SCNC: 139 MMOL/L (ref 136–145)
TROPONIN I SERPL DL<=0.01 NG/ML-MCNC: 0.04 NG/ML (ref ?–0.03)
WBC # BLD AUTO: 11 THOU/UL (ref 4.8–10.8)
WBC UR QL AUTO: (no result) HPF (ref 0–3)

## 2019-12-27 RX ADMIN — DOCUSATE SODIUM 50 MG AND SENNOSIDES 8.6 MG SCH TAB: 8.6; 5 TABLET, FILM COATED ORAL at 09:51

## 2019-12-27 RX ADMIN — Medication PRN LOZ: at 02:31

## 2019-12-27 RX ADMIN — Medication PRN LOZ: at 18:06

## 2019-12-27 RX ADMIN — INSULIN HUMAN PRN UNIT: 100 INJECTION, SOLUTION PARENTERAL at 15:28

## 2019-12-27 RX ADMIN — Medication PRN LOZ: at 21:05

## 2019-12-27 RX ADMIN — INSULIN HUMAN PRN UNIT: 100 INJECTION, SOLUTION PARENTERAL at 21:09

## 2019-12-27 RX ADMIN — Medication PRN LOZ: at 05:12

## 2019-12-27 RX ADMIN — DOCUSATE SODIUM 50 MG AND SENNOSIDES 8.6 MG SCH: 8.6; 5 TABLET, FILM COATED ORAL at 20:30

## 2019-12-27 NOTE — PRG
DATE OF SERVICE:  12/27/2019



SUBJECTIVE:  The patient remains on the surgical floor.  She is postop day #5,

status post open reduction and internal fixation of right bimalleolar fracture 
and

closed treatment of right second and third metatarsals.  The patient has been

awaiting placement.  Unfortunately, her insurance is out of network and network

problems have become cumbersome, though the Case Management team has been 
working

diligently to get her placed.  She states that her family cannot take care of 
her at

this time.  Overnight, the patient reportedly had some episodes of coughing that

made her O2 saturations dropped.  She was given 20 mg of Lasix, which seemed to

help.  She also had increased breathing treatments.  Her chest x-ray was

unremarkable for signs of fluid overload or pneumonia.  She has been afebrile. 



OBJECTIVE:  VITAL SIGNS:  Temperature is 97.5, heart rate 85, blood pressure 152
/74,

respirations 18, oxygen saturation 100% on 2 L via nasal cannula. 

GENERAL:  The patient is resting comfortably, sitting on the side of the bed.  
She

is conversant, discussed her desires for placement and understands that 
insurance is

becoming an issue. 

LUNGS:  She had an occasional cough.  She has some scattered rhonchi 
bilaterally on

her lung exam. 

HEART:  Regular rate and rhythm. 

ABDOMEN:  Soft, flat, nontender with active bowel sounds. 

EXTREMITIES:  Neurovascularly intact x4.  Postop right lower extremity is clean,

dry, and intact, and splint on it. 



LABORATORY FINDINGS:  White blood cell count 11.0, hemoglobin 11.9, hematocrit 
35.5,

platelets 260.  Sodium 139, potassium 2.8, chloride 102, CO2 of 28, BUN 15,

creatinine 0.88, glucose 116, phosphorus 4.1.  Troponin 0.054 with a repeat of

0.035. 



RADIOGRAPHIC REPORTS:  AP chest x-ray shows no evidence of consolidation, mass, 
or

pleural effusion "stable exam."  Echocardiogram shows left ventricular ejection

fraction estimated at 60% to 65%. 



ASSESSMENT:  

1. Status post ground level fall.

2. Postop day #5, status post open reduction and internal fixation of right

bimalleolar fracture. 

3. Acute renal insufficiency, resolved.  The patient had hydrocortisone 
discontinued.

4. Acute blood loss anemia, stable, improved.

5. Urinary tract infection, started treatment.  Unknown if present on admission.



PLAN:  Plan will be to continue pulmonary toilet as is.  We will continue to

monitor.  The patient did relate to me that she has a history of chronic 
bronchitis

at this time of year.  We will continue to encourage physical and occupational

therapy and await placement decision. 







Job ID:  [id number]



MTDD

## 2019-12-27 NOTE — PRG
DATE OF SERVICE:  12/26/2019



SUBJECTIVE:  Ms. García is a 74-year-old female, status post ground level fall.

She sustained right ankle fracture.  She underwent ORIF of right ankle fracture

postop day #4.  Earlier this evening, the patient developed severe dry cough,

shortness of breath. 



OBJECTIVE:  VITAL SIGNS:  Her SpO2 in the range of 87 to 90, respiratory rate is 24

per minute, heart rate is 100, blood pressure 135/71. 

GENERAL:  The patient developed shortness of breath after she went to bedside

commode and she tried to position herself in the bed. 

LUNGS:  Rales bilaterally. 

HEART:  Regular rate and rhythm.  The patient also complained of chest pain

__________. 

EXTREMITIES:  Neurovascularly intact x4.  Postop dressing clean, dry, and intact.



ASSESSMENT:  

1. Possible chest pain, possible congestive heart failure, status post ground level

fall. 

2. Right bimalleolar fracture, status post ORIF of right bimalleolar fracture.



PLAN:  The patient will have oxygen treatment, EKG and troponin stat.  Lasix 20 mg

IV.  Echocardiography __________ tomorrow.  We will continue to monitor and treat

accordingly __________.  Anticipate placement in rehabilitation facility or skilled

nursing home facility. 







Job ID:  [id number]

## 2019-12-27 NOTE — RAD
EXAM: Single view of the chest



HISTORY:   Cough



COMPARISON: 12/25/2019



FINDINGS: Single view of the chest shows an enlarged but stable cardiomediastinal silhouette.  The pa
cemaker is unchanged in position. Increased interstitial markings are stable.  There is no evidence

of consolidation, mass, or pleural effusion. The bones are unremarkable. There are calcified breast i
mplants.



IMPRESSION: Stable exam



Reported By: Ismael Soto 

Electronically Signed:  12/27/2019 7:42 AM

## 2019-12-28 LAB
ANION GAP SERPL CALC-SCNC: 11 MMOL/L (ref 10–20)
BASOPHILS # BLD AUTO: 0.1 THOU/UL (ref 0–0.2)
BASOPHILS NFR BLD AUTO: 0.8 % (ref 0–1)
BUN SERPL-MCNC: 13 MG/DL (ref 9.8–20.1)
CALCIUM SERPL-MCNC: 8.3 MG/DL (ref 7.8–10.44)
CHLORIDE SERPL-SCNC: 104 MMOL/L (ref 98–107)
CO2 SERPL-SCNC: 26 MMOL/L (ref 23–31)
CREAT CL PREDICTED SERPL C-G-VRATE: 62 ML/MIN (ref 70–130)
EOSINOPHIL # BLD AUTO: 0.5 THOU/UL (ref 0–0.7)
EOSINOPHIL NFR BLD AUTO: 5.3 % (ref 0–10)
GLUCOSE SERPL-MCNC: 141 MG/DL (ref 83–110)
HGB BLD-MCNC: 11.4 G/DL (ref 12–16)
LYMPHOCYTES # BLD: 3.7 THOU/UL (ref 1.2–3.4)
LYMPHOCYTES NFR BLD AUTO: 41.5 % (ref 21–51)
MCH RBC QN AUTO: 30.2 PG (ref 27–31)
MCV RBC AUTO: 89.1 FL (ref 78–98)
MONOCYTES # BLD AUTO: 0.5 THOU/UL (ref 0.11–0.59)
MONOCYTES NFR BLD AUTO: 5.9 % (ref 0–10)
NEUTROPHILS # BLD AUTO: 4.2 THOU/UL (ref 1.4–6.5)
NEUTROPHILS NFR BLD AUTO: 46.5 % (ref 42–75)
PLATELET # BLD AUTO: 280 THOU/UL (ref 130–400)
POTASSIUM SERPL-SCNC: 2.9 MMOL/L (ref 3.5–5.1)
RBC # BLD AUTO: 3.76 MILL/UL (ref 4.2–5.4)
SODIUM SERPL-SCNC: 138 MMOL/L (ref 136–145)
WBC # BLD AUTO: 8.9 THOU/UL (ref 4.8–10.8)

## 2019-12-28 RX ADMIN — DOCUSATE SODIUM 50 MG AND SENNOSIDES 8.6 MG SCH TAB: 8.6; 5 TABLET, FILM COATED ORAL at 20:42

## 2019-12-28 RX ADMIN — INSULIN HUMAN PRN UNIT: 100 INJECTION, SOLUTION PARENTERAL at 23:04

## 2019-12-28 RX ADMIN — Medication PRN LOZ: at 20:44

## 2019-12-28 RX ADMIN — DOCUSATE SODIUM 50 MG AND SENNOSIDES 8.6 MG SCH TAB: 8.6; 5 TABLET, FILM COATED ORAL at 08:53

## 2019-12-28 NOTE — PRG
DATE OF SERVICE:  12/28/2019



SUBJECTIVE:  The patient is currently on the surgical floor.  She is postoperative

day 6 from a right bimalleolar fracture, in which she underwent open reduction and

internal fixation and also underwent closed treatment of the right second and third

metatarsal fractures.  The patient has been waiting several days for insurance

approval, and the Case Management team is again working to get her placed.

Overnight, she had no issues.  She states that her cough has improved, and she began

treatment for urinary tract infection yesterday.  The patient continues to work with

Physical Therapy.  She is tolerating a diet, and her bowel function has returned. 



OBJECTIVE:  VITAL SIGNS:  Temperature is 97.6, heart rate 79, blood pressure 119/63,

respirations 16, oxygen saturation 95% on 2 L via nasal cannula. 

GENERAL:  The patient is resting comfortably in bed.  She is awake, alert,

appropriate, and conversant. 

HEENT:  Unremarkable. 

LUNGS:  Clear to auscultation bilaterally.  She has recently had a breathing

treatment, which seems to have cleared the rhonchi that I was hearing yesterday. 

HEART:  Regular rate and rhythm. 

ABDOMEN:  Soft, flat, and nontender with active bowel sounds. 

EXTREMITIES:  Neurovascularly intact x4.  The patient does have a clean, dry, and

intact splint on her right lower extremity. 



LABORATORY FINDINGS:  White blood cell count 8.9, hemoglobin 11.4, hematocrit 33.5,

platelets 280.  Sodium 138, potassium 2.9, chloride 104, CO2 of 26, BUN 13,

creatinine 0.74, glucose 141, magnesium 1.9, phosphorus 4.3. 



ASSESSMENT:  

1. Status post ground level fall.

2. Postoperative day 6 status post open reduction and internal fixation of right

bimalleolar fracture. 

3. Acute adrenal insufficiency, resolved.

4. Acute blood loss anemia, stable, improved.

5. Hypokalemia, the patient will be given potassium today.  We will recheck her labs

tomorrow. 

6. Urinary tract infection, under treatment.



PLAN:  Plan will be to continue supportive care and treatment as described above.

We will continue to encourage physical and occupational therapy and await final

placement decision. 







Job ID:  [id number]

## 2019-12-28 NOTE — PRG
DATE OF SERVICE:  12/27/2019



SUBJECTIVE:  Ms. García is a 74-year-old female, status post ground-level fall.

She sustained right ankle fracture __________.  Patient reports she has been doing

well.  Pain is well controlled.  She is able to work with PT/OT.  Her cough appears

to be better.  She developed no fever or shortness of breath.  Currently, patient is

lying in bed comfortably with no acute respiratory distress. 



OBJECTIVE:  VITAL SIGNS:  Stable. 

LUNGS:  Clear bilaterally. 

HEART:  Scattered rales bilaterally. 

EXTREMITIES:  Neurovascularly intact x4.  Postop dressing clean, dry, intact.



ASSESSMENT AND PLAN:  Continue supportive care.  Continue pain control.  Continue

working with PT/OT.  Anticipate placement in rehabilitation facility. 







Job ID:  [id number]

## 2019-12-29 LAB
ANION GAP SERPL CALC-SCNC: 13 MMOL/L (ref 10–20)
BASOPHILS # BLD AUTO: 0 THOU/UL (ref 0–0.2)
BASOPHILS NFR BLD AUTO: 0.1 % (ref 0–1)
BUN SERPL-MCNC: 14 MG/DL (ref 9.8–20.1)
CALCIUM SERPL-MCNC: 8.7 MG/DL (ref 7.8–10.44)
CHLORIDE SERPL-SCNC: 107 MMOL/L (ref 98–107)
CO2 SERPL-SCNC: 27 MMOL/L (ref 23–31)
CREAT CL PREDICTED SERPL C-G-VRATE: 52 ML/MIN (ref 70–130)
EOSINOPHIL # BLD AUTO: 0.5 THOU/UL (ref 0–0.7)
EOSINOPHIL NFR BLD AUTO: 4.9 % (ref 0–10)
GLUCOSE SERPL-MCNC: 179 MG/DL (ref 83–110)
HGB BLD-MCNC: 11.3 G/DL (ref 12–16)
HIV 1/2 INDEX: 0.09 S/CO (ref ?–1)
LYMPHOCYTES # BLD: 3 THOU/UL (ref 1.2–3.4)
LYMPHOCYTES NFR BLD AUTO: 32.7 % (ref 21–51)
MAGNESIUM SERPL-MCNC: 2 MG/DL (ref 1.6–2.6)
MCH RBC QN AUTO: 29.5 PG (ref 27–31)
MCV RBC AUTO: 89.3 FL (ref 78–98)
MONOCYTES # BLD AUTO: 0.8 THOU/UL (ref 0.11–0.59)
MONOCYTES NFR BLD AUTO: 8.5 % (ref 0–10)
NEUTROPHILS # BLD AUTO: 5 THOU/UL (ref 1.4–6.5)
NEUTROPHILS NFR BLD AUTO: 53.8 % (ref 42–75)
PLATELET # BLD AUTO: 282 THOU/UL (ref 130–400)
POTASSIUM SERPL-SCNC: 3.9 MMOL/L (ref 3.5–5.1)
RBC # BLD AUTO: 3.81 MILL/UL (ref 4.2–5.4)
SODIUM SERPL-SCNC: 143 MMOL/L (ref 136–145)
WBC # BLD AUTO: 9.3 THOU/UL (ref 4.8–10.8)

## 2019-12-29 RX ADMIN — DOCUSATE SODIUM 50 MG AND SENNOSIDES 8.6 MG SCH: 8.6; 5 TABLET, FILM COATED ORAL at 21:58

## 2019-12-29 RX ADMIN — CEFTRIAXONE SCH MLS: 1 INJECTION, POWDER, FOR SOLUTION INTRAMUSCULAR; INTRAVENOUS at 09:44

## 2019-12-29 RX ADMIN — INSULIN HUMAN PRN UNIT: 100 INJECTION, SOLUTION PARENTERAL at 20:53

## 2019-12-29 RX ADMIN — Medication PRN LOZ: at 06:12

## 2019-12-29 RX ADMIN — INSULIN HUMAN PRN UNIT: 100 INJECTION, SOLUTION PARENTERAL at 11:41

## 2019-12-29 RX ADMIN — INSULIN GLARGINE SCH MLS: 100 INJECTION, SOLUTION SUBCUTANEOUS at 20:49

## 2019-12-29 RX ADMIN — DOCUSATE SODIUM 50 MG AND SENNOSIDES 8.6 MG SCH TAB: 8.6; 5 TABLET, FILM COATED ORAL at 09:41

## 2019-12-29 RX ADMIN — INSULIN HUMAN PRN UNIT: 100 INJECTION, SOLUTION PARENTERAL at 07:08

## 2019-12-29 NOTE — CT
EXAM: CT of the chest without contrast



HISTORY: Bilateral lung infiltrates



COMPARISON: Chest x-ray 10/29/2019



TECHNIQUE: Multiple contiguous axial images were obtained in a CT the chest without contrast. Coronal
 and sagittal reformats were performed.



FINDINGS:



HEART: Normal in size without focal cardiac abnormality. There is a pacemaker with its leads in the r
ight atrium and ventricle.

MEDIASTINUM: There are slightly prominent mediastinal lymph nodes measuring up to 1.7 cm in dimension
. Evaluation of the mediastinum is limited without IV contrast.



LUNGS: Diffuse increased interstitial lung markings are seen. Honeycombing is seen in the bilateral l
ower lobes. Traction bronchiectasis is seen. There may be a subtle superimposed acute infiltrate in

the right upper lobe.

PLEURAL SPACE: No pneumothorax or pleural effusion.



CHEST WALL SOFT TISSUES: Calcified bilateral breast implants.

OSSEOUS STRUCTURES: Degenerative changes in the spine.

VISUALIZED SUBDIAPHRAGMATIC STRUCTURES: Unremarkable



IMPRESSION: 



1. Diffuse increased interstitial lung disease with possible superimposed infiltrate in the right upp
er lobe

2. Nonspecific mildly enlarged mediastinal lymph nodes.



Reported By: Ismael Soto 

Electronically Signed:  12/29/2019 3:40 PM

## 2019-12-29 NOTE — PRG
DATE OF SERVICE:  12/28/2019



SUBJECTIVE:  Ms. García is a 74-year-old female, status post ground level fall with

right ankle fracture, status post ORIF of right ankle fracture, postop day 7.  The

patient had been up and low O2 saturation for a few days.  The patient has been

treated with fluid overload on December 27th and December 26th.  Her condition

seemed to be improved a little; however, her cough not resolved.  At the moment when

I saw her, the patient coughed aggressively with yellow phlegm. 



OBJECTIVE:  VITAL SIGNS:  Her O2 saturation dropped from 83% to 89% on 5 L oxygen

cannula. 

LUNGS:  Rales bilaterally. 

HEART:  Regular rate and rhythm. 

EXTREMITIES:  Neurovascularly intact x4.  Dressing clean, dry, intact.



DIAGNOSTIC DATA:  Chest x-ray show infiltration bilaterally.



ASSESSMENT AND PLAN:  

1. Acute respiratory distress, possibly due to hospitalized pneumonia, penicillin

allergy, status post ground level fall. 

2. Right ankle fracture, status post open reduction and internal fixation of right

ankle fracture. 

3. Acute adrenal insufficiency, resolved.

4. Acute blood loss anemia, stable.

5. Hypokalemia treated.

6. Urinary tract infection, treated.



PLAN:  The patient will be transferred to [facility] for BiPAP treatment.  We will

initiate antibiotic IV.  Consult Infectious Disease with appropriate antibiotic

choice according to the patient's symptom and x-ray image.  Continue supportive

care.  Continue pain control.   is working for patient placement in

rehabilitation facility. 







Job ID:  [id number]

## 2019-12-29 NOTE — RAD
EXAM: Single view of the chest



HISTORY:   Respiratory failure and hypoxia



COMPARISON: 12/27/2019



FINDINGS: Single view of the chest shows a normal sized cardiomediastinal silhouette.  The pacemaker 
is unchanged in position. Increased interstitial markings are present.  There is no evidence of

consolidation, mass, or pleural effusion. The bones are unremarkable. The patient has calcified bilat
eral breast implants.



IMPRESSION: Stable exam



Reported By: Ismael Soto 

Electronically Signed:  12/29/2019 7:40 AM

## 2019-12-29 NOTE — CON
DATE OF CONSULTATION:  12/29/2019



REASON FOR CONSULTATION:  Pneumonia.



HISTORY OF PRESENT ILLNESS:  A 74-year-old, who lives in [place] and has a history

of type 2 diabetes, coronary artery disease with a pacemaker and recently was

diagnosed with influenza by her PCP in [place], was treated before about a week

with antimicrobial therapy, she does not recall the name of the medication.  She

continued to have respiratory symptoms with cough and sputum production, although

the fever improved.  She was getting ready to come visit family members, and then

she fell and injured her right ankle.  Actually, she had already traveled to this

area and that is when she fell and came to the emergency room.  The initial findings

include /77, pulse 92, respirations 20, O2 saturation 96, temperature 98.3,

did not appear in distress.  Breath sounds were clear.  Heart exam showed a systolic

murmur.  The abdomen was normal.  White cell count 10.9, hemoglobin 13, platelets

220, creatinine 0.85, and the differential was essentially normal.  Very mild

neutrophilia.  Urinalysis with 21 to 50 wbc's.  The patient was given ceftriaxone,

Tessalon, inhalers, and currently she feels moderately improved, she is still

coughing intermittently with yellow sputum production.  No headaches, visual

symptoms, sore throat, odynophagia, or dysphagia.  No back pain.  No chest pain or

abdominal pain.  No diarrhea or constipation.  No genitourinary symptoms except for

some incontinence.  No joint symptoms.  No neurological symptoms. 



PAST MEDICAL HISTORY:  Type 2 diabetes, coronary artery disease, bradycardia with

prior syncopal events, managed with a pacemaker, recent episode of influenza

diagnosed by her PCP and treated in [place] 2 weeks before admission. 



PAST SURGICAL HISTORY:  There is also a report of appendectomy and hysterectomy.



ALLERGIES:  NONE.



SOCIAL HISTORY:  Quit smoking 30 years ago.  Used to manage restaurants in the area

where she lives right now.  No alcoholic beverage use. 



CURRENT MEDICATIONS:  

1. DuoNeb.

2. Zithromax.

3. Ceftriaxone.

4. Famotidine.

5. Apresoline.

6. Insulin.

7. Zofran.



PHYSICAL EXAMINATION:

VITAL SIGNS:  T-max 98.5, blood pressure 101/75, pulse 100, respirations 22, and O2

saturation 95. 

SKIN:  Exam showed peripheral IV access.  She is voiding in the diaper.  No

lymphadenopathy. 

HEENT:  Ocular movements conjugate.  Oral cavity with quite a few teeth in place

with some decay and gum disease.  Oral mucosa is normal. 

NECK:  Without jugular vein distention.  No thyromegaly. 

LUNGS:  With scattered crackles, kind of faint.  No wheezing. 

HEART:  S1 and S2.  Regular rate.  No S3 or S4. 

ABDOMEN:  Soft, not distended or tender.  No ascites.  No bladder distention. 

EXTREMITIES:  No joint inflammatory activity.  Moves extremities equally. 

NEUROLOGIC:  She is awake, oriented, follows commands, pleasant.  Good recollection.

 Speech is normal.  Neuro examination, nonfocal. 



LABORATORY DATA:  Latest labs:  White cell count 9.3, hemoglobin 11.3, platelets

282.  The creatinine is 0.85.  Influenza test was negative.  Blood culture, no

growth in 48 hours.  Respiratory culture with polymicrobial fernando, 0-5 epi cells

with moderate WBCs. 



ASSESSMENT:  

1. Recent episode of reported influenza diagnosed elsewhere.

2. Persistence of respiratory symptoms with purulent sputum production, some

abnormalities on chest x-ray, sort of diffuse some elements of interstitial, others

of alveolar subsegmental infiltrates. 



DISCUSSION:  The differential diagnosis includes residual from her recent viral

infection, superimposed bacterial pneumonia or bacterial bronchitis or an atypical

infection, for example, fungal or mycobacterial, specifically atypical mycobacterial

infection.  Submit cultures for AFB and fungal.  Check CT of chest and respiratory

virus PCR panel. 







Job ID:  [id number]

## 2019-12-30 LAB
ANION GAP SERPL CALC-SCNC: 14 MMOL/L (ref 10–20)
BASOPHILS # BLD AUTO: 0.1 THOU/UL (ref 0–0.2)
BASOPHILS NFR BLD AUTO: 0.8 % (ref 0–1)
BUN SERPL-MCNC: 13 MG/DL (ref 9.8–20.1)
CALCIUM SERPL-MCNC: 8.4 MG/DL (ref 7.8–10.44)
CHLORIDE SERPL-SCNC: 104 MMOL/L (ref 98–107)
CO2 SERPL-SCNC: 27 MMOL/L (ref 23–31)
CREAT CL PREDICTED SERPL C-G-VRATE: 50 ML/MIN (ref 70–130)
EOSINOPHIL # BLD AUTO: 0.6 THOU/UL (ref 0–0.7)
EOSINOPHIL NFR BLD AUTO: 6.7 % (ref 0–10)
GLUCOSE SERPL-MCNC: 185 MG/DL (ref 83–110)
HGB BLD-MCNC: 11.6 G/DL (ref 12–16)
LEUKOCYTE ESTERASE UR QL STRIP.AUTO: 25 LEU/UL
LYMPHOCYTES # BLD: 2.6 THOU/UL (ref 1.2–3.4)
LYMPHOCYTES NFR BLD AUTO: 28.2 % (ref 21–51)
MAGNESIUM SERPL-MCNC: 1.8 MG/DL (ref 1.6–2.6)
MCH RBC QN AUTO: 30.2 PG (ref 27–31)
MCV RBC AUTO: 89.7 FL (ref 78–98)
MONOCYTES # BLD AUTO: 0.7 THOU/UL (ref 0.11–0.59)
MONOCYTES NFR BLD AUTO: 7.7 % (ref 0–10)
NEUTROPHILS # BLD AUTO: 5.1 THOU/UL (ref 1.4–6.5)
NEUTROPHILS NFR BLD AUTO: 56.6 % (ref 42–75)
PLATELET # BLD AUTO: 266 THOU/UL (ref 130–400)
POTASSIUM SERPL-SCNC: 3.5 MMOL/L (ref 3.5–5.1)
PROT UR STRIP.AUTO-MCNC: 10 MG/DL
RBC # BLD AUTO: 3.84 MILL/UL (ref 4.2–5.4)
RBC UR QL AUTO: (no result) HPF (ref 0–3)
SODIUM SERPL-SCNC: 141 MMOL/L (ref 136–145)
WBC # BLD AUTO: 9 THOU/UL (ref 4.8–10.8)
WBC UR QL AUTO: (no result) HPF (ref 0–3)

## 2019-12-30 RX ADMIN — CEFTRIAXONE SCH MLS: 1 INJECTION, POWDER, FOR SOLUTION INTRAMUSCULAR; INTRAVENOUS at 08:43

## 2019-12-30 RX ADMIN — INSULIN GLARGINE SCH MLS: 100 INJECTION, SOLUTION SUBCUTANEOUS at 09:12

## 2019-12-30 RX ADMIN — INSULIN GLARGINE SCH MLS: 100 INJECTION, SOLUTION SUBCUTANEOUS at 21:12

## 2019-12-30 RX ADMIN — DOCUSATE SODIUM 50 MG AND SENNOSIDES 8.6 MG SCH: 8.6; 5 TABLET, FILM COATED ORAL at 21:12

## 2019-12-30 RX ADMIN — INSULIN LISPRO PRN UNIT: 100 INJECTION, SOLUTION INTRAVENOUS; SUBCUTANEOUS at 06:39

## 2019-12-30 RX ADMIN — INSULIN LISPRO PRN UNIT: 100 INJECTION, SOLUTION INTRAVENOUS; SUBCUTANEOUS at 11:42

## 2019-12-30 RX ADMIN — DOCUSATE SODIUM 50 MG AND SENNOSIDES 8.6 MG SCH: 8.6; 5 TABLET, FILM COATED ORAL at 09:00

## 2019-12-30 NOTE — PRG
DATE OF SERVICE:  12/30/2019



SUBJECTIVE:  Ms. García is a 74-year-old female with status post ground level fall.

 She sustained right ankle fracture, status post ORIF of right ankle fracture.  She

developed respiratory distress __________ with pneumonia.  The patient reports she

has been doing good.  Pain is well controlled.  Her cough has improved.  Respiratory

distress is resolved.  The patient __________ not working with PT/OT much due to

fatigue. 



OBJECTIVE:  GENERAL:  Currently, the patient lying down in bed comfortable with no

acute respiratory distress. 

VITAL SIGNS:  SpO2 is 97 on 2 L. 

LUNGS:  Scattered rale bilaterally. 

HEART:  Regular rate and rhythm.  Postop dressing clean, dry, and intact.



PLAN:  Plan will be to continue supportive care.  Continue pain control.  Continue

DVT prophylaxis.  Anticipate placement and rehabilitation facility.  Continue IV

antibiotic for pneumonia. 







Job ID:  [id number]

## 2019-12-30 NOTE — PRG
DATE OF SERVICE:  12/30/2019



SUBJECTIVE:  A 74-year-old female, seen in [facility] today, transferred there two days

ago.  Of note, my progress note from yesterday has yet to be dictated.  The patient

was transferred there for hypoxic respiratory failure, thought to need BiPAP.  She

has never required BiPAP.  Yesterday, antibiotics were changed to ceftriaxone and

azithromycin to cover urine.  She has excoriation about her labia, noted some

dysuria.  She had a previous UTI and nitrofurantoin likely because of her age was

stopped.  The patient yesterday was started on steroids after CT chest demonstrated

honeycombing and evidence of IPF.  She is continued on breathing treatments.  She is

now on 2 L of oxygen, saturating 99%.  She states that her breathing is much

improved since modalities yesterday.  ID was actually consulted for the same.  She

does have a respiratory culture with polymicrobial right now, and still waiting for

further as well as a viral panel that is showing human metapneumovirus.  Therefore,

she is in precautions.  Blood cultures are negative today.  She remains afebrile.

She is hemodynamically stable. 



At the time of my exam, the patient is sitting up in the edge of the bed.  States

that she feels better.  Her pain is well controlled in her foot.  I have discussed

with the bedside RN.  We stopped IV fluids.  She has tolerated diet.  She is working

with PT.  She is very concerned about spreading the virus to other persons.  No

other change.  We have placed orders to transfer to the [facility]. 



OBJECTIVE:  VITAL SIGNS:  Today temperature is 98.4, blood pressure 137/66, heart

rate is 88.  She is breathing 18 times per minute and saturating 97% on 2 L oxygen

nasal cannula. 

GENERAL:  This is a 74-year-old female, sitting up in the edge of bed, in no acute

distress. 

HEENT:  Normocephalic and atraumatic.  Trachea is midline.  No JVD is appreciated. 

RESPIRATORY:  Equal rise and fall.  No crackles again.  No wheezes today.  Moves air

well. 

CARDIOVASCULAR:  Regular rate and rhythm.  Trace murmur.  No edema.  Strong pulses. 

ABDOMEN:  Soft and nontender.  Pelvis is stable. 

MUSCULOSKELETAL:  She has the brace applied to the right lower extremity.  She does

have CMS, warm digits, and able to move these well. 

NEUROLOGIC:  Alert and oriented to person, place, time, and event. 

PSYCHIATRIC:  Normal mood and affect.



LABORATORY DATA:  Laboratory data from today; again micro showing the human

metapneumovirus. 



Blood cultures x2 are negative. 



Respiratory culture is pending. 



White blood cell count is 9.0, platelets are 266, hemoglobin and hematocrit are 11.6

and 34.4 respectively.  Sodium is 141, potassium 3.5, chloride is 104, CO2 is 27,

BUN is 13, creatinine is 0.92, glucose 185, calcium is 8.4, phos is 4.1, Mag 1.8. 



ASSESSMENT:  

1. Acute hypoxic respiratory failure, improved.

2. Idiopathic pulmonary fibrosis.  Dr. Lorenzana has consulted and appreciated recs.

3. Right bimalleolar ankle fracture, status post open reduction and internal

fixation. 

4. Acute adrenal insufficiency, resolved.

5. Acute blood loss anemia, stable.

6. Hypokalemia and hypomagnesemia, on replacement.

7. Urinary tract infection.

8. Human metapneumovirus, likely contributing to #1 above.

9. Acute traumatic pain, improved.



PLAN:  

1. Transferred out of the [facility] to the surgery [facility].

2. Continue ceftriaxone and azithromycin.

3. Change IV steroids to p.o.

4. Continue point-of-care glucose and aggressive sliding scale insulin.

5. Continue Levemir for now b.i.d. dosing.

6. Continue electrolyte replacement protocol.

7. Continue isolation precautions.

8. Continue pain control as needed.

9. The patient was denied for rehab initially, did not qualify given her acute

respiratory failure.  I asked her about rehab versus home placement.  She does not

want to go to rehab.  She states that she lift weights.  She is generally

independent.  She is working with PT on the floor and she remains stable, may be

able to go home.  However, we may need to evaluate the need for home oxygen or

re-evaluate the need for a couple days of rehab, which I have expressed may be in

her best interest given her protracted course during this hospitalization. 

10. Continue DVT and PPI prophylaxis.







Job ID:  [id number]

## 2019-12-30 NOTE — CON
DATE OF CONSULTATION:  12/30/2019



CONSULTING PROVIDER:  Reji Rangel from Trauma Service.



REASON FOR CONSULTATION:  Evaluate for interstitial lung disease.



HISTORY OF PRESENT ILLNESS:  Ms. García is a 74-year-old, who was admitted to the

trauma service on 12/22/2019 after falling down the steps, hitting the back of her

head, and reportedly losing consciousness.  She had an open reduction and internal

fixation of a right bimalleolar ankle fracture and closed treatment of a right 2nd

and 3rd metatarsal fracture by Orthopedics on 12/22/2019.  She is a very poor

historian and is very confused, is not completely clear to me why she is in the

critical care unit.  One note indicates that she desaturated and was sent to the

IMCU for BiPAP treatment.  She may be in the CCU just because the IMCU has been

full. 



I asked her specifically about any previous diagnosis of lung disease.  She

apparently has been involved with some pulmonologist in the [place] area.  She was

told she is on the verge of having COPD.  She also is familiar with the term

pulmonary fibrosis, but is not sure whether or not she was told she had that entity.

 During this hospitalization, she has had a CT of the chest, which shows subpleural

honeycombing with diffuse interstitial changes that would be compatible with the

diagnosis of idiopathic pulmonary fibrosis. 



From a pulmonary standpoint, she is not currently using BiPAP.  She is on a low-flow

nasal cannula and her O2 sats are about 99%. 



PAST MEDICAL HISTORY:  

1. Questionable COPD.

2. Type 2 diabetes mellitus.

3. Coronary artery disease.

4. Bradycardia with syncopal events, requiring a pacemaker.

5. Recent episode of influenza.



PAST SURGICAL HISTORY:  

1. Appendectomy.

2. Hysterectomy.



SOCIAL HISTORY:  Quit smoking about 30 years ago.  She lives in [place].



MEDICATIONS PRIOR TO ADMISSION:  

1. DuoNeb.

2. Zithromax.

3. Ceftriaxone.

4. Famotidine.

5. Apresoline.

6. Insulin.

7. Zofran.



REVIEW OF SYSTEMS:  Cannot be obtained because of her confusion.



ALLERGIES:  PENICILLIN.



PHYSICAL EXAMINATION:

VITAL SIGNS:  Temperature 98.9, pulse 80, blood pressure 124/71, and O2 saturation

99%. 

GENERAL:  She easily awakens.  She does not appear to be in any distress. 

HEENT:  Unremarkable. 

NECK:  No adenopathy or JVD. 

LUNGS:  She has diffuse harsh expiratory crackles, most prominent over the bases

posteriorly. 

CARDIOVASCULAR:  S1 and S2, slightly tachycardic without murmur. 

ABDOMEN:  Soft and nontender. 

EXTREMITIES:  No clubbing, cyanosis, or edema.



LABORATORY DATA:  White blood cell count 9, hematocrit 34.4, and platelet count 266.

 Sodium 141, potassium 3.5, chloride 104, CO2 of 27, BUN 13, creatinine 0.9, and

glucose 185.  HIV test is nonreactive.  A respiratory virus panel was positive for

human metapneumovirus.  A flu swab from a couple of days ago was negative. 



I reviewed CT scan of the chest.  I personally agreed with findings by the

radiologist. 



ASSESSMENT:  

1. Interstitial lung disease, CT scan is likely reflective of the diagnosis of

idiopathic pulmonary fibrosis.  Her situation is being complicated by current upper

respiratory tract infection. 

2. Concussion.

3. Ankle fracture.



PLAN:  

1. Nothing specific needs to be done for the idiopathic pulmonary fibrosis other

than supplemental oxygen.  She will need further followup with her pulmonologist in

the [place] area. 

2. Otherwise, supportive care including the antibiotics, nebulization treatments,

and IV steroids.  I think we can go ahead and reduce the steroid dose and switch her

to oral prednisone.  From my standpoint, she is stable for transfer to the floor. 







Job ID:  [id number]

## 2019-12-31 LAB
ANION GAP SERPL CALC-SCNC: 11 MMOL/L (ref 10–20)
BUN SERPL-MCNC: 27 MG/DL (ref 9.8–20.1)
CALCIUM SERPL-MCNC: 9 MG/DL (ref 7.8–10.44)
CHLORIDE SERPL-SCNC: 107 MMOL/L (ref 98–107)
CO2 SERPL-SCNC: 24 MMOL/L (ref 23–31)
CREAT CL PREDICTED SERPL C-G-VRATE: 55 ML/MIN (ref 70–130)
GLUCOSE SERPL-MCNC: 191 MG/DL (ref 83–110)
MAGNESIUM SERPL-MCNC: 2.2 MG/DL (ref 1.6–2.6)
POTASSIUM SERPL-SCNC: 4.4 MMOL/L (ref 3.5–5.1)
SODIUM SERPL-SCNC: 138 MMOL/L (ref 136–145)

## 2019-12-31 RX ADMIN — DOCUSATE SODIUM 50 MG AND SENNOSIDES 8.6 MG SCH: 8.6; 5 TABLET, FILM COATED ORAL at 13:06

## 2019-12-31 RX ADMIN — INSULIN LISPRO PRN UNIT: 100 INJECTION, SOLUTION INTRAVENOUS; SUBCUTANEOUS at 17:22

## 2019-12-31 RX ADMIN — DOCUSATE SODIUM 50 MG AND SENNOSIDES 8.6 MG SCH TAB: 8.6; 5 TABLET, FILM COATED ORAL at 20:28

## 2019-12-31 RX ADMIN — CEFTRIAXONE SCH MLS: 1 INJECTION, POWDER, FOR SOLUTION INTRAMUSCULAR; INTRAVENOUS at 09:11

## 2019-12-31 RX ADMIN — INSULIN GLARGINE SCH MLS: 100 INJECTION, SOLUTION SUBCUTANEOUS at 20:28

## 2019-12-31 RX ADMIN — INSULIN GLARGINE SCH MLS: 100 INJECTION, SOLUTION SUBCUTANEOUS at 09:14

## 2019-12-31 RX ADMIN — INSULIN LISPRO PRN UNIT: 100 INJECTION, SOLUTION INTRAVENOUS; SUBCUTANEOUS at 20:29

## 2019-12-31 NOTE — PRG
DATE OF SERVICE:  12/30/2019



SUBJECTIVE:  Ms. García is a 74-year-old female, status post ground level fall.

She sustained right ankle fracture, status post ORIF of right ankle fracture.  The

patient also sustained from pneumonia an idiopathic pulmonary fibrosis per

Pulmonologist, Dr. Lorenzana.  The patient's respiratory distress improved and her

cough has resolved.  The patient reports she has been doing good.  Pain is well

controlled.  She has been working with PT/OT and her diet is normal. 



OBJECTIVE:  GENERAL:  Currently, the patient is lying in bed comfortable.  No acute

respiratory distress. 

VITAL SIGNS:  Stable. 

LUNGS:  Scattered rales bilaterally. 

HEART:  Regular rate and rhythm.   

EXTREMITIES:  Postop dressing clean, dry, and intact.



PLAN:  Will be continue supportive care.  Continue pain control.  Continue DVT

prophylaxis.  Anticipate placement in rehabilitation facility. 







Job ID:  [id number]

## 2019-12-31 NOTE — PRG
DATE OF SERVICE:  12/29/2019



SUBJECTIVE:  Ms. García is a 74-year-old female, she is hospital day #7, postop day

__________ from bimalleolar fracture, who was transferred once again to the [facility]

overnight for respiratory distress.  The patient was hypoxemic.  She was given

Lasix, transferred to [facility] for BiPAP therapy and started on Levaquin.  Of note, ID

apparently was consulted by previous team. 



I evaluated the patient at the bedside.  I have reviewed her laboratory data, vital

signs, and notes in the chart.  The patient is saturating 100% on 2 L oxygen nasal

cannula.  She never used BiPAP overnight.  She is spontaneously voiding.  I have

reviewed and she did have bacteria and some nitrites noted in her urine on 12/27,

initially started on nitrofurantoin.  I believe this was stopped likely because of

the patient's advanced age.  She is now on Levaquin.  This does not cover E coli;

however, we do not have a urine culture well based on the antibiogram here.  Chest

x-ray most recent is from 12/27, I have ordered and reviewed.  Additional chest

x-ray today does show some possible edema.  Do not see any wenceslao consolidation.  She

does have rales noted.  I have taken her off oxygen in the room and she actually

desaturates into the low 90s, specifically when she was lying flat, even worse with

this.  Heart rate is controlled.  Blood pressure is stable.  The patient does

endorse some shortness of air.  She states that her pain is minimally controlled.

The patient's glucose is still over 200.  She does not take insulin.  At home, she

actually does not use oxygen or breathing treatments.  An echocardiogram from

yesterday shows a preserved EF at 60% to 65%, moderate LVH.  She does have diastolic

dysfunction.  She also endorses some difficulty with urination and pain.  She has

chronic incontinence and now she has maceration of her labia majora and minora

according to the bedside RN, I evaluated this with the bedside RN. 



OBJECTIVE:  From today; 

VITAL SIGNS:  Temperature is 98.6, blood pressure 111/68, heart rate is 90, she is

saturating 99% on 2 L of oxygen nasal cannula, breathing 18 times per minute. 

GENERAL:  A 74-year-old female, sitting up in bed, really no acute distress.

Speaking in full sentences. 

HEENT:  Normocephalic and atraumatic.  Trachea is midline.  Very trace JVD noted

bilaterally. 

RESPIRATORY:  Equal rise and fall.  Bilateral breath sounds are clear, upper.  No

wheezes.  She does have some crackles noted, worse in the left base compared to the

right base with slight increased work of breathing. 

ABDOMEN:  Soft and nontender. 

PELVIS:  Stable. 

MUSCULOSKELETAL:  She moves her extremities well.  She does have bruising noted to

the right lower extremity, it is splinted and elevated.  She has good sensation and

warm digits. 

PSYCHIATRIC:  Normal mood and affect. 

NEUROLOGIC:  Alert and oriented to person, place, time, and event. 

SKIN:  Pink, warm, and dry.



LABORATORY DATA:  From today:  White blood cell count of 9.3, platelets 282.

Hemoglobin and hematocrit are 11.3 and 34.0 respectively.  Sodium is 143, potassium

3.9, chloride is 107, CO2 is 27, creatinine 0.88, BUN of 14, glucose is 216, calcium

is 8.7, phosphorous is 2.7, and mag of 2.0.  Echo, as noted above.  Chest x-ray

reviewed, shows slight worsening patchy infiltrates throughout compared to her

initial chest x-ray, but it has improved from the chest x-ray on 12/27 on my read.

I see the pacemaker, there is no pneumothorax.  I do not see any wenceslao

consolidation. 



ASSESSMENT:  

1. Status post ground level fall.

2. Postoperative day __________ status post open reduction and internal fixation of

the right bimalleolar fracture. 

3. Acute adrenal insufficiency, resolved.

4. Acute blood loss anemia, stable.

5. Hypokalemia, stable.

6. Urinary tract infection, needing treatment.

7. Skin maceration, likely from chronic incontinence.

8. Acute hypoxic respiratory failure, requiring oxygen therapy.

9. Likely pulmonary edema leading to above.

10. Sinusitis versus pneumonia with productive cough and shortness of air.

11. Hyperglycemia.

12. Heart failure with preserved EF and diastolic dysfunction.



PLAN:  

1. Continue oxygen now 1 L/minute to maintain SpO2 greater than 92%.

2. Continue DuoNebs for now, however, no wheezes, as well as budesonide.

3. Additional Lasix 40 IV now.

4. Continue moderate sliding scale insulin.

5. Add Levemir 5 units daily.

6. Pain control as needed __________ has been suspended.

7. Never used BiPAP.  Does not appear to be in acute distress at this time.

8. Respiratory culture is pending.

9. Blood cultures are pending.

10. Repeat UA.

11. I have discussed penicillin allergy with the patient.  She states that she was

told many years ago she did have a penicillin allergy.  However, she has used this

multiple times since then and had no complications.  Therefore, I believe these

drugs would be appropriate. 

12. I would like to cover urine and lungs together.  However, I am not completely

convinced that she has pneumonia with no wenceslao infiltrate.  She does have a

productive cough, but she has no leukocytosis and no fever.  Therefore, this likely

could be secondary to some volume overload, especially in the setting of diastolic

dysfunction.  I will change from broad-spectrum of Levaquin to ceftriaxone and

azithromycin.  The ceftriaxone should cover her UTI well and the azithromycin would

cover any atypicals for pneumonia also. 

13. Repeat BMP, mag, and phosphate levels tomorrow.

14. Electrolyte replacement as needed.

15. If continues to improve, not have respiratory decompensation, we will transfer

back to the surgery [facility] later today. 

16. Diet will be regular.

17. Access is peripheral IV.

18. Prophylaxis, Pepcid and Lovenox.

19. Disposition is IMCU, hopefully back to the surgery [facility] later today if remained

stable. 

20. Full code right now. 

I have updated the patient and answered all questions at the bedside.  I have

coordinated the care with the bedside RN. 





Job ID:  [id number]

## 2019-12-31 NOTE — PRG
DATE OF SERVICE:  12/31/2019



SUBJECTIVE:  The patient seems to be doing okay.  She is scheduled to go to [facility] in [place].  I am not sure what day she is leaving. 



OBJECTIVE:  VITAL SIGNS:  Temperature is 97.4, pulse rate 102, respirations are 20,

O2 saturation 91% on room air, and blood pressure 119/69. 

HEENT:  Unremarkable. 

NECK:  No JVD. 

LUNGS:  Diminished breath sounds in the bases with crackles. 

CARDIAC:  S1 and S2.  Regular. 

ABDOMEN:  Soft. 

EXTREMITIES:  No edema.



ASSESSMENT:  Likely idiopathic pulmonary fibrosis.



PLAN:  She can go home or to the nursing home at any time.  She may need

supplemental oxygen if her O2 saturation is below 88% on room air.  Her prednisone

dose should be 40 mg a day for a week, then 20 mg a day for a week, and then I would

stop it.  She can follow up with her pulmonologist in [place].  I will check her

Thursday if she is still here.  Otherwise, call  __________. 







Job ID:  [id number]

## 2019-12-31 NOTE — PRG
DATE OF SERVICE:  12/31/2019



SUBJECTIVE:  The patient was seen during evening rounds awake, alert, in no

distress, watching TV.  The patient voices no complaints at this time.  The 
patient

reports that her pain is well controlled.  The patient denies any chest pain or

shortness of breath.  She continues to have a productive cough. 



OBJECTIVE:  VITAL SIGNS:  Stable, afebrile. 

GENERAL:  Well-appearing elderly female, sitting in hospital bed, in no acute

distress. 

PULMONARY:  Equal chest rise and fall, good inspiratory and expiratory effort,

respirations are even and nonlabored. 

CARDIAC:  Regular rate, regular rhythm. 

EXTREMITIES:  Moves all extremities, distal pulses intact.  Right lower 
extremity

was splint clean, dry, and intact. 



ASSESSMENT:  

1. Status post fall from 6 feet.

2. Right bimalleolar fracture, status post repair.

3. Right second and third metatarsal fracture.

4. Concussion, improving.

5. Acute adrenal insufficiency, resolved.

6. Acute respiratory failure due to pneumonia, resolving.

7. Urinary tract infection.

8. Idiopathic pulmonary fibrosis, stable.

9. History of diabetes, pacemaker, coronary artery disease.



PLAN:  Continue supportive care.  Continue current antibiotics.  Continue 
prednisone

per Pulmonary.  Continue physical and occupational therapy.  The patient is 
pending

insurance approval for skilled nursing facility.  The plan was discussed with 
the

patient, who agrees. 







Job ID:  [id number]



[facility]

## 2019-12-31 NOTE — PRG
DATE OF SERVICE:  



SUBJECTIVE:  The patient was seen this morning sitting up at edge of bed, having

breakfast with no signs of acute distress.  She reported her pain was well

controlled.  She denied any shortness of breath or chest pain.  Reports 
persistent

productive cough, but reports that the mucus is thickening up. 



OBJECTIVE:  VITAL SIGNS:  Temperature 98.8, pulse 90, respirations 16, oxygen

saturation 93% on 2 L nasal cannula, and blood pressure 101/67. 

GENERAL:  Well-appearing elderly female, sitting up in bed with no signs of 
acute

distress. 

PULMONARY:  Equal chest rise and fall.  Clear breath sounds bilaterally.  No 
signs

of acute respiratory distress. 

CARDIAC:  Regular rate and rhythm. 

GI:  Abdomen is soft, nontender, and nondistended. 

EXTREMITIES:  2+ pulses in all extremities.  Gross motor and sensation intact.

Right lower extremity with splint, that is clean, dry and intact. 

NEUROLOGIC:  GCS is 15.



ASSESSMENT:  

1. Status post fall from 6 feet.

2. Right bimalleolar fracture, status post repair.

3. Right second and third metatarsal fracture, status post repair.

4. Concussion, improving.

5. Acute adrenal insufficiency, resolved.

6. Acute respiratory failure due to pneumonia, resolving.

7. Urinary tract infection.

8. Idiopathic pulmonary fibrosis, stable.

9. History of diabetes, pacemaker, and coronary artery disease.



PLAN:  Continue current diet and pain regimen.  Continue current antibiotics.  
We

will replace phosphorus today.  Per the recommendations of Dr. Lorenzana, we will 
keep

the patient on 40 mg of p.o. prednisone for 1 week, then downgrade to 20 mg 
p.o. for

another week.  We are still pending AF[employer] of the sputum as recommended by 
Infectious 

Disease.  We will follow it up any new recs from ID.  Continue physical and 
occupational

therapy.  I have spoke to Case Management and they are following up with the

insurance for placement at a skilled nursing facility.  The patient is in 
agreement.







Job ID:  [id number]



[facility]

## 2020-01-01 RX ADMIN — CEFTRIAXONE SCH MLS: 1 INJECTION, POWDER, FOR SOLUTION INTRAMUSCULAR; INTRAVENOUS at 09:28

## 2020-01-01 RX ADMIN — INSULIN GLARGINE SCH MLS: 100 INJECTION, SOLUTION SUBCUTANEOUS at 09:33

## 2020-01-01 RX ADMIN — DOCUSATE SODIUM 50 MG AND SENNOSIDES 8.6 MG SCH: 8.6; 5 TABLET, FILM COATED ORAL at 09:29

## 2020-01-01 RX ADMIN — INSULIN LISPRO PRN UNIT: 100 INJECTION, SOLUTION INTRAVENOUS; SUBCUTANEOUS at 16:51

## 2020-01-01 RX ADMIN — INSULIN LISPRO PRN UNIT: 100 INJECTION, SOLUTION INTRAVENOUS; SUBCUTANEOUS at 05:27

## 2020-01-01 RX ADMIN — DOCUSATE SODIUM 50 MG AND SENNOSIDES 8.6 MG SCH: 8.6; 5 TABLET, FILM COATED ORAL at 21:49

## 2020-01-01 RX ADMIN — INSULIN LISPRO PRN UNIT: 100 INJECTION, SOLUTION INTRAVENOUS; SUBCUTANEOUS at 21:51

## 2020-01-01 RX ADMIN — INSULIN LISPRO PRN UNIT: 100 INJECTION, SOLUTION INTRAVENOUS; SUBCUTANEOUS at 12:08

## 2020-01-01 NOTE — PRG
DATE OF SERVICE:  01/01/2020



SUBJECTIVE:  The patient was seen this morning lying in bed with no signs of acute

distress.  She reported that she had some difficulty sleeping earlier in the

evening, but had been resting well this morning.  States pain is well controlled and

she continues to work with Physical and Occupational Therapy.  Denies shortness of

breath and reports her respiratory status is improving. 



OBJECTIVE:  VITAL SIGNS:  Temperature 98.2, pulse rate 86, respirations are 16,

oxygen saturation 92% on 2 L nasal cannula, and blood pressure 162/76. 

GENERAL:  A well-appearing elderly female, lying in bed with no signs of acute

distress. 

PULMONARY:  Equal chest rise and fall.  Clear breath sounds bilaterally.  No signs

of acute respiratory distress.  She does have a cough. 

CARDIAC:  Regular rate and rhythm.  No murmurs, gallops, or rubs. 

GI:  Abdomen is soft, nontender, and nondistended. 

EXTREMITIES:  2+ pulses in all extremities.  No significant swelling noted.  Gross

motor and sensation are intact.  Right lower extremity with splint is clean, dry,

and intact. 



LABORATORY FINDINGS:  Hemoglobin A1c completed today demonstrates a value of 9.2.

The patient's hemoglobin has been consistently greater than 200 during the majority

of her stay here with a reading of 383 yesterday evening. 



DIAGNOSTIC FINDINGS:  There are no new diagnostic findings to report.



ASSESSMENT:  

1. Status post fall from 6 feet.

2. Right bimalleolar fracture, status post repair.

3. Right 2nd and 3rd metatarsal fracture, status post repair.

4. Concussion, resolved.

5. Acute adrenal insufficiency, resolved.

6. Acute respiratory failure due to viral pneumonia, improving.

7. Urinary tract infection, stable.

8. Interstitial lung disease.

9. Uncontrolled hyperglycemia.

10. History of diabetes, pacemaker, and coronary artery disease.



PLAN:  Continue the patient on a current diabetic diet and pain regimen.  Continue

Physical and Occupational Therapy.  Continue steroids and antibiotics.  We will

downgrade the patient's scheduled nebs to q.8 hours from q.4 hours.  We will

discontinue the patient's long-acting insulin and start her on metformin 500 mg

b.i.d. with an insulin sliding scale.  The patient reports that she was previously

diet-controlled diabetic, but it is clear now, but she needs daily medications.  We

are pending placement at [facility] at this time. 







Job ID:  [id number]

## 2020-01-01 NOTE — PRG
DATE OF SERVICE:  01/01/2020



SUBJECTIVE:  This is a 74-year-old female, who is postop day #10, status post 
fall

with a right bimalleolar fracture.  The patient is currently awake, alert, 
sitting

up in bed watching TV.  The patient voices no complaints at this time.  The 
patient

was able to work with Physical Therapy today.  The patient does report some

increased pain today with physical therapy.  The patient continues to have a 
clear

productive cough.  The patient continues to require oxygen.  The patient denies 
any

shortness of breath or chest pain at this time. 



OBJECTIVE:  VITAL SIGNS:  Stable, afebrile. 

GENERAL:  Well-appearing elderly female, sitting up in hospital bed, in no acute

distress. 

PULMONARY:  Equal chest rise and fall, bilateral breath sounds clear in upper 
lobes,

rales in bilateral lower lobes, no respiratory distress, positive cough. 

CARDIAC:  Regular rate, regular rhythm, no murmurs. 

ABDOMEN:  Soft, nontender, nondistended. 

EXTREMITIES:  2+ pulses in all extremities.  No pedal edema.  Positive motor and

sensation intact in all extremities.  Right lower extremity splint is clean, dry
,

and intact. 



ASSESSMENT:  

1. Status post fall from 6 feet.

2. Right bimalleolar fracture status post repair.

3. Right second and third metatarsal fracture.

4. Concussion, resolved.

5. Acute adrenal insufficiency, resolved.

6. Acute respiratory failure due to viral pneumonia, improving.

7. Urinary tract infection, stable.

8. Interstitial lung disease.

9. Uncontrolled hyperglycemia.

10. History of diabetes, pacemaker, and coronary artery disease.



PLAN:  Continue supportive care.  Continue current diabetic diet.  We will 
increase

the patient's gabapentin to 3 times a day as she has reported increased pain 
with

physical therapy.  Continue to have the patient work with physical and 
occupational

therapy.  Continue steroids and antibiotics.  The patient is pending

placement and insurance approval to [facility].  The plan was discussed 
with

the patient who agrees. 







Job ID:  [id number]



[facility]

## 2020-01-02 VITALS — DIASTOLIC BLOOD PRESSURE: 68 MMHG | TEMPERATURE: 98 F | SYSTOLIC BLOOD PRESSURE: 107 MMHG

## 2020-01-02 LAB
ANION GAP SERPL CALC-SCNC: 13 MMOL/L (ref 10–20)
BUN SERPL-MCNC: 22 MG/DL (ref 9.8–20.1)
CALCIUM SERPL-MCNC: 8.9 MG/DL (ref 7.8–10.44)
CHLORIDE SERPL-SCNC: 103 MMOL/L (ref 98–107)
CO2 SERPL-SCNC: 23 MMOL/L (ref 23–31)
CREAT CL PREDICTED SERPL C-G-VRATE: 61 ML/MIN (ref 70–130)
GLUCOSE SERPL-MCNC: 122 MG/DL (ref 83–110)
HGB BLD-MCNC: 10.9 G/DL (ref 12–16)
MAGNESIUM SERPL-MCNC: 1.9 MG/DL (ref 1.6–2.6)
MCH RBC QN AUTO: 29.3 PG (ref 27–31)
MCV RBC AUTO: 88.8 FL (ref 78–98)
MDIFF COMPLETE?: YES
PLATELET # BLD AUTO: 328 THOU/UL (ref 130–400)
POTASSIUM SERPL-SCNC: 4.2 MMOL/L (ref 3.5–5.1)
RBC # BLD AUTO: 3.7 MILL/UL (ref 4.2–5.4)
SODIUM SERPL-SCNC: 135 MMOL/L (ref 136–145)
WBC # BLD AUTO: 14 THOU/UL (ref 4.8–10.8)

## 2020-01-02 RX ADMIN — INSULIN LISPRO PRN UNIT: 100 INJECTION, SOLUTION INTRAVENOUS; SUBCUTANEOUS at 17:36

## 2020-01-02 RX ADMIN — INSULIN LISPRO PRN UNIT: 100 INJECTION, SOLUTION INTRAVENOUS; SUBCUTANEOUS at 12:28

## 2020-01-02 RX ADMIN — DOCUSATE SODIUM 50 MG AND SENNOSIDES 8.6 MG SCH TAB: 8.6; 5 TABLET, FILM COATED ORAL at 09:04

## 2020-01-03 NOTE — DIS
DATE OF ADMISSION:  12/26/2019



DATE OF DISCHARGE:  01/02/2020



ADMISSION DIAGNOSES:  Mechanical fall from 6 feet, right bimalleolar fracture, right

second and third metatarsal fracture and concussion. 



DISCHARGE DIAGNOSES:  Mechanical fall from 6 feet, right bimalleolar fracture, right

second and third metatarsal fracture and concussion, acute adrenal insufficiency,

acute respiratory distress, viral pneumonia, urinary tract infection, and idiopathic

pulmonary fibrosis. 



CONSULTING PHYSICIANS:  

1. Dr. Becker of Orthopedic Surgery.

2. Dr. Lorenzana of Pulmonology.

3. Dr. Kothari of Infectious Disease.



PROCEDURES:  The patient went to the OR on December 22, 2019, had ORIF of the right

bimalleolar fracture and close treatment of the right second and third metatarsal

fractures. 



HOSPITAL COURSE:  The patient is a 74-year-old female, who presented to the

emergency department after a fall through steps, falling about 6 feet.  She was

found to have a right bimalleolar fracture and right second and third metatarsal

fracture as well as a concussion.  She went to the OR the same day with Dr. Becker

and had ORIF of the right bimalleolar ankle fracture and close treatment of the

right second and third metatarsal fractures.  Her concussion resolved on its own.

During her hospital stay, she did develop acute adrenal insufficiency and received

steroids and this resolved.  She also did have an episode of acute respiratory

distress, which ultimately was found to be a viral pneumonia.  Urinary tract

infection was also treated.  Pulmonology saw the patient and diagnosed to have a

pulmonary fibrosis.  At the time of discharge, the patient's pain was well

controlled.  She was tolerating a diabetic diet, and she was working with Physical

and Occupational Therapy, voiding without issues.  The patient was also started on

metformin during this hospital stay as she was previously a diet-controlled

diabetic, but her A1c was 9.1. 



DISCHARGE DISPOSITION:  Skilled nursing facility.



DISCHARGE CONDITION:  Satisfactory.



PHYSICAL EXAMINATION:

VITAL SIGNS:  Temperature 98, pulse 75, respirations 16, oxygen saturation 93% on 2

L nasal cannula, and blood pressure 107/68. 

GENERAL:  Well-appearing elderly female, sitting up in bed with no signs of acute

distress. 

PULMONARY:  Equal chest rise and fall.  Clear breath sounds bilaterally.  No signs

of acute respiratory distress. 

CARDIAC:  Regular rate and rhythm.  No murmurs, gallops, or rubs. 

GI:  Abdomen is soft, nontender, nondistended. 

EXTREMITIES:  2+ pulses in all extremities.  Gross motor and sensation intact.  No

significant swelling noted.  Splint to right lower extremity is clean, dry, and

intact. 



DISCHARGE INSTRUCTIONS:  The patient was discharged to a skilled nursing facility.

She is nonweightbearing on the right lower extremity.  Activity as tolerated.  She

has a diabetic diet with Glucerna b.i.d.  She is to receive PT and OT.  She has a

walker, p.r.n. oxygen to keep her O2 saturation above 91%.  Nebulizer treatments

p.r.n. and incentive spirometry. 



DISCHARGE MEDICATIONS:  Include;

1. Tylenol.

2. Tessalon Perles.

3. Pulmicort.

4. Cepastat lozenges.

5. Flexeril.

6. Lovenox.

7. Gabapentin.

8. Ibuprofen.

9. Ipratropium/albuterol sulfate nebulizers.

10. Melatonin.

11. Metformin.

12. MiraLAX.

13. Prednisone.

14. Sennosides.

15. Tramadol.



FOLLOWUP APPOINTMENTS:  The patient is to follow up with Dr. Becker in 10 days.

The patient also needs to follow up with a pulmonologist, Dr. Lorenzana.  She lives in

[place].  So she may follow up with a pulmonologist there.  There is no need for

followup with [facility].  The patient also needs to follow up with her PCP

within 1 month for re-evaluation of metformin. 



This is a summary of the patient's hospitalization.  For full details, please see

her medical record in its entirety. 







Job ID:  [id number]